# Patient Record
Sex: FEMALE | Race: WHITE | NOT HISPANIC OR LATINO | Employment: OTHER | URBAN - METROPOLITAN AREA
[De-identification: names, ages, dates, MRNs, and addresses within clinical notes are randomized per-mention and may not be internally consistent; named-entity substitution may affect disease eponyms.]

---

## 2017-08-02 ENCOUNTER — HOSPITAL ENCOUNTER (OUTPATIENT)
Dept: LAB | Facility: HOSPITAL | Age: 82
Discharge: HOME | End: 2017-08-02
Attending: INTERNAL MEDICINE | Admitting: INTERNAL MEDICINE

## 2017-08-02 LAB
INR PPP: 1.9 (ref 2–3)
PROTHROMBIN TIME: 20.5 SEC (ref 19.4–28.5)

## 2017-10-27 ENCOUNTER — HOSPITAL ENCOUNTER (OUTPATIENT)
Dept: FAMILY MEDICINE CLINIC | Facility: CLINIC | Age: 82
Setting detail: SPECIMEN
Discharge: HOME | End: 2017-10-27

## 2017-10-27 LAB — TSH SERPL-ACNC: 1.2 UIU/ML (ref 0.34–5.6)

## 2018-01-01 ENCOUNTER — HOSPITAL ENCOUNTER (OUTPATIENT)
Dept: LAB | Facility: HOSPITAL | Age: 83
Discharge: HOME | End: 2018-11-02
Attending: INTERNAL MEDICINE | Admitting: INTERNAL MEDICINE

## 2018-01-01 ENCOUNTER — PATIENT OUTREACH (OUTPATIENT)
Dept: CASE MANAGEMENT | Facility: OTHER | Age: 83
End: 2018-01-01

## 2018-01-01 ENCOUNTER — HOSPITAL ENCOUNTER (OUTPATIENT)
Dept: FAMILY MEDICINE CLINIC | Facility: CLINIC | Age: 83
Setting detail: SPECIMEN
Discharge: HOME | End: 2018-11-09
Attending: PHYSICIAN ASSISTANT | Admitting: PHYSICIAN ASSISTANT

## 2018-01-01 ENCOUNTER — HOSPITAL ENCOUNTER (OUTPATIENT)
Dept: LAB | Facility: HOSPITAL | Age: 83
Discharge: HOME | End: 2018-11-15
Attending: INTERNAL MEDICINE | Admitting: INTERNAL MEDICINE

## 2018-01-01 LAB
ANION GAP SERPL CALC-SCNC: 15.9 MMOL/L (ref 10–20)
BILIRUB UR QL STRIP: NEGATIVE MG/DL
BUN SERPL-MCNC: 18 MG/DL (ref 8–20)
BUN/CREAT SERPL: 10 (ref 5.4–26.2)
CALCIUM SERPL-MCNC: 8.9 MG/DL (ref 8.9–10.3)
CASTS URNS QL MICRO: NORMAL /[LPF]
CHLORIDE SERPL-SCNC: 102 MMOL/L (ref 101–111)
COLOR UR: YELLOW
CONV BACTERIA IN URINE MICRO: NEGATIVE
CONV CLARITY OF URINE: CLEAR
CONV CO2: 24 MMOL/L (ref 22–32)
CONV HYALINE CASTS IN URINE MICRO: 0 /[LPF] (ref 0–5)
CONV PROTEIN IN URINE BY AUTOMATED TEST STRIP: NEGATIVE MG/DL
CONV SMALL ROUND CELLS: NORMAL /[HPF]
CONV UROBILINOGEN IN URINE BY AUTOMATED TEST STRIP: 0.2 MG/DL
CREAT UR-MCNC: 1.8 MG/DL (ref 0.4–1)
CULTURE INDICATED?: NORMAL
GLUCOSE SERPL-MCNC: 108 MG/DL (ref 65–99)
GLUCOSE UR QL: NEGATIVE MG/DL
HGB UR QL STRIP: NEGATIVE
INR PPP: 2.6 (ref 2–3)
KETONES UR QL STRIP: NEGATIVE MG/DL
LEUKOCYTE ESTERASE UR QL STRIP: NEGATIVE
NITRITE UR QL STRIP: NEGATIVE
PH UR STRIP.AUTO: 6 [PH] (ref 4.5–8)
POTASSIUM SERPL-SCNC: 3.9 MMOL/L (ref 3.6–5.1)
PROTHROMBIN TIME: 25.1 SEC (ref 19.4–28.5)
RBC #/AREA URNS HPF: 0 /[HPF] (ref 0–3)
SODIUM SERPL-SCNC: 138 MMOL/L (ref 136–144)
SP GR UR: 1.01 (ref 1–1.03)
SPERM URNS QL MICRO: NORMAL /[HPF]
SQUAMOUS SPT QL MICRO: 0 /[HPF] (ref 0–5)
UNIDENT CRYS URNS QL MICRO: NORMAL /[HPF]
WBC #/AREA URNS HPF: 0 /[HPF] (ref 0–5)
YEAST SPEC QL WET PREP: NORMAL /[HPF]

## 2018-05-29 ENCOUNTER — INPATIENT HOSPITAL (OUTPATIENT)
Dept: URBAN - METROPOLITAN AREA HOSPITAL 84 | Facility: HOSPITAL | Age: 83
End: 2018-05-29
Payer: COMMERCIAL

## 2018-05-29 DIAGNOSIS — K31.82 DIEULAFOY LESION (HEMORRHAGIC) OF STOMACH AND DUODENUM: ICD-10-CM

## 2018-05-29 DIAGNOSIS — K44.9 DIAPHRAGMATIC HERNIA WITHOUT OBSTRUCTION OR GANGRENE: ICD-10-CM

## 2018-05-29 DIAGNOSIS — R93.3 ABNORMAL FINDINGS ON DIAGNOSTIC IMAGING OF OTHER PARTS OF DI: ICD-10-CM

## 2018-05-29 DIAGNOSIS — D50.0 IRON DEFICIENCY ANEMIA SECONDARY TO BLOOD LOSS (CHRONIC): ICD-10-CM

## 2018-05-29 DIAGNOSIS — K92.1 MELENA: ICD-10-CM

## 2018-05-29 PROCEDURE — 43255 EGD CONTROL BLEEDING ANY: CPT | Performed by: INTERNAL MEDICINE

## 2018-05-30 PROCEDURE — 99232 SBSQ HOSP IP/OBS MODERATE 35: CPT | Performed by: INTERNAL MEDICINE

## 2018-05-31 ENCOUNTER — INPATIENT HOSPITAL (OUTPATIENT)
Dept: URBAN - METROPOLITAN AREA HOSPITAL 84 | Facility: HOSPITAL | Age: 83
End: 2018-05-31
Payer: COMMERCIAL

## 2018-05-31 DIAGNOSIS — K31.82 DIEULAFOY LESION (HEMORRHAGIC) OF STOMACH AND DUODENUM: ICD-10-CM

## 2018-05-31 PROCEDURE — 99232 SBSQ HOSP IP/OBS MODERATE 35: CPT | Performed by: INTERNAL MEDICINE

## 2018-06-07 ENCOUNTER — HOSPITAL ENCOUNTER (OUTPATIENT)
Dept: HOME HEALTH SERVICES | Facility: HOME HEALTHCARE | Age: 83
Setting detail: SPECIMEN
Discharge: HOME | End: 2018-06-07
Attending: FAMILY MEDICINE | Admitting: FAMILY MEDICINE

## 2018-06-07 LAB
ANION GAP SERPL CALC-SCNC: 10.7 MMOL/L (ref 10–20)
BUN SERPL-MCNC: 14 MG/DL (ref 8–20)
BUN/CREAT SERPL: 8.8 (ref 5.4–26.2)
CALCIUM SERPL-MCNC: 8.9 MG/DL (ref 8.9–10.3)
CHLORIDE SERPL-SCNC: 108 MMOL/L (ref 101–111)
CONV CO2: 23 MMOL/L (ref 22–32)
CREAT UR-MCNC: 1.6 MG/DL (ref 0.4–1)
GLUCOSE SERPL-MCNC: 107 MG/DL (ref 65–99)
POTASSIUM SERPL-SCNC: 4.7 MMOL/L (ref 3.6–5.1)
SODIUM SERPL-SCNC: 137 MMOL/L (ref 136–144)

## 2018-06-14 ENCOUNTER — OFFICE (OUTPATIENT)
Dept: URBAN - METROPOLITAN AREA CLINIC 64 | Facility: CLINIC | Age: 83
End: 2018-06-14
Payer: COMMERCIAL

## 2018-06-14 VITALS
HEIGHT: 61 IN | WEIGHT: 119 LBS | SYSTOLIC BLOOD PRESSURE: 85 MMHG | DIASTOLIC BLOOD PRESSURE: 55 MMHG | HEART RATE: 83 BPM

## 2018-06-14 DIAGNOSIS — R93.3 ABNORMAL FINDINGS ON DIAGNOSTIC IMAGING OF OTHER PARTS OF DI: ICD-10-CM

## 2018-06-14 DIAGNOSIS — K86.2 CYST OF PANCREAS: ICD-10-CM

## 2018-06-14 DIAGNOSIS — K63.81 DIEULAFOY LESION OF INTESTINE: ICD-10-CM

## 2018-06-14 PROCEDURE — 99213 OFFICE O/P EST LOW 20 MIN: CPT | Performed by: NURSE PRACTITIONER

## 2018-07-13 ENCOUNTER — OFFICE (OUTPATIENT)
Dept: URBAN - METROPOLITAN AREA CLINIC 64 | Facility: CLINIC | Age: 83
End: 2018-07-13
Payer: COMMERCIAL

## 2018-07-13 VITALS
HEART RATE: 83 BPM | HEIGHT: 61 IN | WEIGHT: 119 LBS | SYSTOLIC BLOOD PRESSURE: 112 MMHG | DIASTOLIC BLOOD PRESSURE: 59 MMHG

## 2018-07-13 DIAGNOSIS — R93.3 ABNORMAL FINDINGS ON DIAGNOSTIC IMAGING OF OTHER PARTS OF DI: ICD-10-CM

## 2018-07-13 DIAGNOSIS — K86.2 CYST OF PANCREAS: ICD-10-CM

## 2018-07-13 PROCEDURE — 99213 OFFICE O/P EST LOW 20 MIN: CPT | Performed by: INTERNAL MEDICINE

## 2018-08-02 ENCOUNTER — HOSPITAL ENCOUNTER (OUTPATIENT)
Dept: FAMILY MEDICINE CLINIC | Facility: CLINIC | Age: 83
Setting detail: SPECIMEN
Discharge: HOME | End: 2018-08-02
Attending: FAMILY MEDICINE | Admitting: FAMILY MEDICINE

## 2018-08-02 LAB
ALBUMIN SERPL-MCNC: 4.1 G/DL (ref 3.5–4.8)
ALBUMIN/GLOB SERPL: 1.2 {RATIO} (ref 1–1.7)
ALP SERPL-CCNC: 65 IU/L (ref 32–91)
ALT SERPL-CCNC: 18 IU/L (ref 14–54)
ANION GAP SERPL CALC-SCNC: 14.5 MMOL/L (ref 10–20)
AST SERPL-CCNC: 37 IU/L (ref 15–41)
BASOPHILS # BLD AUTO: 0.1 10*3/UL (ref 0–0.2)
BASOPHILS NFR BLD AUTO: 1 % (ref 0–2)
BILIRUB SERPL-MCNC: 0.4 MG/DL (ref 0.3–1.2)
BUN SERPL-MCNC: 18 MG/DL (ref 8–20)
BUN/CREAT SERPL: 10 (ref 5.4–26.2)
CALCIUM SERPL-MCNC: 9.4 MG/DL (ref 8.9–10.3)
CHLORIDE SERPL-SCNC: 101 MMOL/L (ref 101–111)
CONV CO2: 27 MMOL/L (ref 22–32)
CONV TOTAL PROTEIN: 7.4 G/DL (ref 6.1–7.9)
CREAT UR-MCNC: 1.8 MG/DL (ref 0.4–1)
DIFFERENTIAL METHOD BLD: (no result)
EOSINOPHIL # BLD AUTO: 0 10*3/UL (ref 0–0.3)
EOSINOPHIL # BLD AUTO: 1 % (ref 0–3)
ERYTHROCYTE [DISTWIDTH] IN BLOOD BY AUTOMATED COUNT: 17 % (ref 11.5–14.5)
GLOBULIN UR ELPH-MCNC: 3.3 G/DL (ref 2.5–3.8)
GLUCOSE SERPL-MCNC: 102 MG/DL (ref 65–99)
HCT VFR BLD AUTO: 28.1 % (ref 35–49)
HGB BLD-MCNC: 9.1 G/DL (ref 12–15)
LYMPHOCYTES # BLD AUTO: 0.5 10*3/UL (ref 0.8–4.8)
LYMPHOCYTES NFR BLD AUTO: 11 % (ref 18–42)
MCH RBC QN AUTO: 26.7 PG (ref 26–32)
MCHC RBC AUTO-ENTMCNC: 32.3 G/DL (ref 32–36)
MCV RBC AUTO: 82.6 FL (ref 80–94)
MONOCYTES # BLD AUTO: 0.6 10*3/UL (ref 0.1–1.3)
MONOCYTES NFR BLD AUTO: 12 % (ref 2–11)
NEUTROPHILS # BLD AUTO: 3.6 10*3/UL (ref 2.3–8.6)
NEUTROPHILS NFR BLD AUTO: 75 % (ref 50–75)
NRBC BLD AUTO-RTO: 0 /100{WBCS}
NRBC/RBC NFR BLD MANUAL: 0 10*3/UL
PLATELET # BLD AUTO: 185 10*3/UL (ref 150–450)
PMV BLD AUTO: 8 FL (ref 7.4–10.4)
POTASSIUM SERPL-SCNC: 4.5 MMOL/L (ref 3.6–5.1)
RBC # BLD AUTO: 3.4 10*6/UL (ref 4–5.4)
SODIUM SERPL-SCNC: 138 MMOL/L (ref 136–144)
WBC # BLD AUTO: 4.8 10*3/UL (ref 4.5–11.5)

## 2018-09-10 NOTE — OUTREACH NOTE
Care Management Plan 9/10/2018   Lifestyle Goals Decrease falls risk;Eat a healthy diet;Avoid respiratory irritants;Fewer exacerbations;Have more energy;Routine follow-up with doctor(s)   Barriers Disease education   Self Management Dietary Changes - Eat More Fruits/Vegetables;Get flu/pneumonia shot;Medication Adherence   Annual Wellness Visit:  Patient Will Schedule   Does patient have depression diagnosis? No   Advanced Directives: Not Interested At This Time   Hospitalizations past 12 months 2 or 3   Discharged From: [facility] - OBSV   Discharged to: Home / Caregiver   Admit Date: 09/03/2014   Discharge Date: 09/04/2018   Discharge destination: Home   Medication Adherence Other (see comment)   Medication Adherence Caregiver assistance.        Purpose of Contact:   ED/Observation follow up related to complaint of shortness of breath     Care Plan Reviewed/Updated: Assessment & Care Plan created this date with Daughter/Caregiver Stacia Parham.     Recent ED/OBSV/Inpatient Activity: [facility] - 9/3/18-9/4/18 Observation      Follow Up Appointments Scheduled: Yes, Caregiver states patient has f/u appointment with Dr. Landaverde scheduled for next week.     My Chart: N/A (Devan Patient)     Advance Care Planning:  Not interested at this time     Annual Wellness Visit: Not interested at this time.     Noted Status of ACO Gaps In Care: To be addressed in future session     The main concerns and/or symptoms the patient would like to address:   Caregiver reports that patient is doing better and has no unaddressed needs or concerns at this time.  Patient has a new walker and is enjoying being a little more independent.       Education/instruction provided by Care Coordinator this contact: Discharge instructions, Importance of follow up care, healthy diet, and falls risks.  Patient caregiver states adherence and understanding of medication regime.  Pt/INR is done monthly by PCP.  She denies other need/concern at this  time.  RN-CA contact information provided.     Follow Up Outreach Due: Ongoing 1MX3    Radha Shepard RN

## 2019-01-01 ENCOUNTER — OFFICE VISIT (OUTPATIENT)
Dept: CARDIOLOGY | Facility: CLINIC | Age: 84
End: 2019-01-01

## 2019-01-01 ENCOUNTER — APPOINTMENT (OUTPATIENT)
Dept: GENERAL RADIOLOGY | Facility: HOSPITAL | Age: 84
End: 2019-01-01

## 2019-01-01 ENCOUNTER — EPISODE CHANGES (OUTPATIENT)
Dept: CASE MANAGEMENT | Facility: OTHER | Age: 84
End: 2019-01-01

## 2019-01-01 ENCOUNTER — HOSPITAL ENCOUNTER (OUTPATIENT)
Dept: LAB | Facility: HOSPITAL | Age: 84
Discharge: HOME | End: 2019-06-04
Attending: INTERNAL MEDICINE | Admitting: INTERNAL MEDICINE

## 2019-01-01 ENCOUNTER — HOSPITAL ENCOUNTER (OUTPATIENT)
Dept: LAB | Facility: HOSPITAL | Age: 84
Discharge: HOME | End: 2019-02-15
Attending: INTERNAL MEDICINE | Admitting: INTERNAL MEDICINE

## 2019-01-01 ENCOUNTER — APPOINTMENT (OUTPATIENT)
Dept: CT IMAGING | Facility: HOSPITAL | Age: 84
End: 2019-01-01

## 2019-01-01 ENCOUNTER — TELEPHONE (OUTPATIENT)
Dept: CARDIOLOGY | Facility: CLINIC | Age: 84
End: 2019-01-01

## 2019-01-01 ENCOUNTER — LAB REQUISITION (OUTPATIENT)
Dept: LAB | Facility: HOSPITAL | Age: 84
End: 2019-01-01

## 2019-01-01 ENCOUNTER — HOSPITAL ENCOUNTER (INPATIENT)
Facility: HOSPITAL | Age: 84
LOS: 4 days | End: 2019-08-19
Attending: INTERNAL MEDICINE | Admitting: INTERNAL MEDICINE

## 2019-01-01 ENCOUNTER — APPOINTMENT (OUTPATIENT)
Dept: ULTRASOUND IMAGING | Facility: HOSPITAL | Age: 84
End: 2019-01-01

## 2019-01-01 ENCOUNTER — HOSPITAL ENCOUNTER (INPATIENT)
Facility: HOSPITAL | Age: 84
LOS: 4 days | Discharge: HOME HEALTH | End: 2019-07-09
Attending: EMERGENCY MEDICINE | Admitting: HOSPITALIST

## 2019-01-01 ENCOUNTER — READMISSION MANAGEMENT (OUTPATIENT)
Dept: CALL CENTER | Facility: HOSPITAL | Age: 84
End: 2019-01-01

## 2019-01-01 ENCOUNTER — HOSPITAL ENCOUNTER (INPATIENT)
Dept: CARDIOLOGY | Facility: HOSPITAL | Age: 84
Discharge: HOME | End: 2019-07-06

## 2019-01-01 ENCOUNTER — INPATIENT HOSPITAL (OUTPATIENT)
Dept: URBAN - METROPOLITAN AREA HOSPITAL 84 | Facility: HOSPITAL | Age: 84
End: 2019-01-01
Payer: COMMERCIAL

## 2019-01-01 ENCOUNTER — HOSPITAL ENCOUNTER (EMERGENCY)
Facility: HOSPITAL | Age: 84
Discharge: HOME | End: 2019-08-13
Attending: EMERGENCY MEDICINE | Admitting: EMERGENCY MEDICINE

## 2019-01-01 ENCOUNTER — PATIENT OUTREACH (OUTPATIENT)
Dept: CASE MANAGEMENT | Facility: OTHER | Age: 84
End: 2019-01-01

## 2019-01-01 ENCOUNTER — ANESTHESIA EVENT (OUTPATIENT)
Dept: GASTROENTEROLOGY | Facility: HOSPITAL | Age: 84
End: 2019-01-01

## 2019-01-01 ENCOUNTER — OFFICE VISIT (OUTPATIENT)
Dept: FAMILY MEDICINE CLINIC | Facility: CLINIC | Age: 84
End: 2019-01-01

## 2019-01-01 ENCOUNTER — ANESTHESIA (OUTPATIENT)
Dept: GASTROENTEROLOGY | Facility: HOSPITAL | Age: 84
End: 2019-01-01

## 2019-01-01 ENCOUNTER — TELEPHONE (OUTPATIENT)
Dept: FAMILY MEDICINE CLINIC | Facility: CLINIC | Age: 84
End: 2019-01-01

## 2019-01-01 VITALS
DIASTOLIC BLOOD PRESSURE: 64 MMHG | BODY MASS INDEX: 19.2 KG/M2 | RESPIRATION RATE: 18 BRPM | OXYGEN SATURATION: 100 % | TEMPERATURE: 98.1 F | HEART RATE: 67 BPM | HEIGHT: 64 IN | SYSTOLIC BLOOD PRESSURE: 123 MMHG | WEIGHT: 112.43 LBS

## 2019-01-01 VITALS
HEART RATE: 73 BPM | OXYGEN SATURATION: 98 % | SYSTOLIC BLOOD PRESSURE: 116 MMHG | BODY MASS INDEX: 19.31 KG/M2 | DIASTOLIC BLOOD PRESSURE: 60 MMHG | WEIGHT: 109 LBS | HEIGHT: 63 IN

## 2019-01-01 VITALS
WEIGHT: 110 LBS | SYSTOLIC BLOOD PRESSURE: 96 MMHG | BODY MASS INDEX: 21.6 KG/M2 | DIASTOLIC BLOOD PRESSURE: 58 MMHG | HEIGHT: 60 IN

## 2019-01-01 VITALS
OXYGEN SATURATION: 96 % | DIASTOLIC BLOOD PRESSURE: 61 MMHG | BODY MASS INDEX: 21.6 KG/M2 | RESPIRATION RATE: 16 BRPM | HEIGHT: 60 IN | TEMPERATURE: 97.6 F | SYSTOLIC BLOOD PRESSURE: 105 MMHG | WEIGHT: 110 LBS | HEART RATE: 61 BPM

## 2019-01-01 VITALS — OXYGEN SATURATION: 96 % | SYSTOLIC BLOOD PRESSURE: 119 MMHG | DIASTOLIC BLOOD PRESSURE: 62 MMHG

## 2019-01-01 VITALS
HEART RATE: 109 BPM | OXYGEN SATURATION: 99 % | RESPIRATION RATE: 16 BRPM | SYSTOLIC BLOOD PRESSURE: 40 MMHG | HEIGHT: 60 IN | TEMPERATURE: 97.6 F | BODY MASS INDEX: 24.11 KG/M2 | DIASTOLIC BLOOD PRESSURE: 27 MMHG | WEIGHT: 122.8 LBS

## 2019-01-01 VITALS
DIASTOLIC BLOOD PRESSURE: 59 MMHG | BODY MASS INDEX: 20.24 KG/M2 | SYSTOLIC BLOOD PRESSURE: 93 MMHG | WEIGHT: 110 LBS | HEIGHT: 62 IN | TEMPERATURE: 97.3 F | HEART RATE: 65 BPM | OXYGEN SATURATION: 96 %

## 2019-01-01 DIAGNOSIS — K92.1 MELENA: ICD-10-CM

## 2019-01-01 DIAGNOSIS — T45.511D: ICD-10-CM

## 2019-01-01 DIAGNOSIS — R53.83 OTHER FATIGUE: ICD-10-CM

## 2019-01-01 DIAGNOSIS — Z95.0 PRESENCE OF CARDIAC PACEMAKER: ICD-10-CM

## 2019-01-01 DIAGNOSIS — N17.9 ACUTE RENAL FAILURE, UNSPECIFIED ACUTE RENAL FAILURE TYPE (HCC): ICD-10-CM

## 2019-01-01 DIAGNOSIS — I95.89 HYPOTENSION DUE TO HYPOVOLEMIA: Primary | ICD-10-CM

## 2019-01-01 DIAGNOSIS — S02.85XA LEFT ORBIT FRACTURE, CLOSED, INITIAL ENCOUNTER (HCC): ICD-10-CM

## 2019-01-01 DIAGNOSIS — D64.89 OTHER SPECIFIED ANEMIAS: ICD-10-CM

## 2019-01-01 DIAGNOSIS — I25.10 ATHEROSCLEROTIC HEART DISEASE OF NATIVE CORONARY ARTERY WITHOUT ANGINA PECTORIS: ICD-10-CM

## 2019-01-01 DIAGNOSIS — E86.1 HYPOTENSION DUE TO HYPOVOLEMIA: Primary | ICD-10-CM

## 2019-01-01 DIAGNOSIS — R53.1 ACUTE WEAKNESS: ICD-10-CM

## 2019-01-01 DIAGNOSIS — I10 ESSENTIAL HYPERTENSION: ICD-10-CM

## 2019-01-01 DIAGNOSIS — T45.511A POISONING BY WARFARIN SODIUM, ACCIDENTAL OR UNINTENTIONAL, INITIAL ENCOUNTER: ICD-10-CM

## 2019-01-01 DIAGNOSIS — K92.2 ACUTE GI BLEEDING: ICD-10-CM
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
## 2019-01-01 DIAGNOSIS — I48.21 PERMANENT ATRIAL FIBRILLATION (HCC): ICD-10-CM

## 2019-01-01 DIAGNOSIS — I25.709 ATHEROSCLEROSIS OF CORONARY ARTERY BYPASS GRAFT OF NATIVE HEART WITH ANGINA PECTORIS (HCC): ICD-10-CM

## 2019-01-01 DIAGNOSIS — I50.9 ACUTE ON CHRONIC CONGESTIVE HEART FAILURE, UNSPECIFIED HEART FAILURE TYPE (HCC): Primary | ICD-10-CM

## 2019-01-01 DIAGNOSIS — E87.5 HYPERKALEMIA: ICD-10-CM

## 2019-01-01 DIAGNOSIS — K92.2 GASTROINTESTINAL HEMORRHAGE, UNSPECIFIED GASTROINTESTINAL HEMORRHAGE TYPE: ICD-10-CM

## 2019-01-01 DIAGNOSIS — S02.2XXA CLOSED FRACTURE OF NASAL BONE, INITIAL ENCOUNTER: ICD-10-CM

## 2019-01-01 DIAGNOSIS — K92.2 GASTROINTESTINAL HEMORRHAGE, UNSPECIFIED: ICD-10-CM

## 2019-01-01 DIAGNOSIS — I87.2 VENOUS INSUFFICIENCY OF BOTH LOWER EXTREMITIES: ICD-10-CM

## 2019-01-01 DIAGNOSIS — S06.0X0A CONCUSSION WITHOUT LOSS OF CONSCIOUSNESS, INITIAL ENCOUNTER: Primary | ICD-10-CM

## 2019-01-01 LAB
ABO + RH BLD: NORMAL
ABO GROUP BLD: NORMAL
ALBUMIN SERPL-MCNC: 2.1 G/DL (ref 3.5–4.8)
ALBUMIN SERPL-MCNC: 2.1 G/DL (ref 3.5–4.8)
ALBUMIN SERPL-MCNC: 2.2 G/DL (ref 3.5–4.8)
ALBUMIN SERPL-MCNC: 2.2 G/DL (ref 3.5–4.8)
ALBUMIN SERPL-MCNC: 2.3 G/DL (ref 3.5–4.8)
ALBUMIN SERPL-MCNC: 2.3 G/DL (ref 3.5–4.8)
ALBUMIN SERPL-MCNC: 2.4 G/DL (ref 3.5–4.8)
ALBUMIN/GLOB SERPL: 0.7 G/DL (ref 1–1.7)
ALBUMIN/GLOB SERPL: 0.8 G/DL (ref 1–1.7)
ALP SERPL-CCNC: 60 U/L (ref 32–91)
ALP SERPL-CCNC: 61 U/L (ref 32–91)
ALP SERPL-CCNC: 81 U/L (ref 32–91)
ALT SERPL W P-5'-P-CCNC: 14 U/L (ref 14–54)
ALT SERPL W P-5'-P-CCNC: 16 U/L (ref 14–54)
ALT SERPL W P-5'-P-CCNC: 16 U/L (ref 14–54)
ALT SERPL W P-5'-P-CCNC: 18 U/L (ref 14–54)
ALT SERPL W P-5'-P-CCNC: 19 U/L (ref 14–54)
ANION GAP SERPL CALC-SCNC: 13.6 MMOL/L (ref 10–20)
ANION GAP SERPL CALC-SCNC: 13.9 MMOL/L (ref 10–20)
ANION GAP SERPL CALCULATED.3IONS-SCNC: 10.2 MMOL/L (ref 5–15)
ANION GAP SERPL CALCULATED.3IONS-SCNC: 10.6 MMOL/L (ref 5–15)
ANION GAP SERPL CALCULATED.3IONS-SCNC: 12.5 MMOL/L (ref 10–20)
ANION GAP SERPL CALCULATED.3IONS-SCNC: 12.6 MMOL/L (ref 10–20)
ANION GAP SERPL CALCULATED.3IONS-SCNC: 12.7 MMOL/L (ref 5–15)
ANION GAP SERPL CALCULATED.3IONS-SCNC: 12.8 MMOL/L (ref 5–15)
ANION GAP SERPL CALCULATED.3IONS-SCNC: 13.2 MMOL/L (ref 5–15)
ANION GAP SERPL CALCULATED.3IONS-SCNC: 13.3 MMOL/L (ref 10–20)
ANION GAP SERPL CALCULATED.3IONS-SCNC: 13.3 MMOL/L (ref 10–20)
ANION GAP SERPL CALCULATED.3IONS-SCNC: 14.4 MMOL/L (ref 5–15)
ANION GAP SERPL CALCULATED.3IONS-SCNC: 14.8 MMOL/L (ref 5–15)
ANION GAP SERPL CALCULATED.3IONS-SCNC: 15.6 MMOL/L (ref 5–15)
ANISOCYTOSIS BLD QL: ABNORMAL
AST SERPL-CCNC: 26 U/L (ref 15–41)
AST SERPL-CCNC: 28 U/L (ref 15–41)
AST SERPL-CCNC: 29 U/L (ref 15–41)
AST SERPL-CCNC: 29 U/L (ref 15–41)
AST SERPL-CCNC: 32 U/L (ref 15–41)
BACTERIA UR QL AUTO: ABNORMAL /HPF
BACTERIA UR QL AUTO: ABNORMAL /HPF
BASOPHILS # BLD AUTO: 0 10*3/MM3 (ref 0–0.2)
BASOPHILS NFR BLD AUTO: 0.2 % (ref 0–1.5)
BASOPHILS NFR BLD AUTO: 0.4 % (ref 0–1.5)
BASOPHILS NFR BLD AUTO: 0.4 % (ref 0–1.5)
BASOPHILS NFR BLD AUTO: 0.6 % (ref 0–1.5)
BASOPHILS NFR BLD AUTO: 0.6 % (ref 0–1.5)
BASOPHILS NFR BLD AUTO: 0.7 % (ref 0–1.5)
BASOPHILS NFR BLD AUTO: 0.9 % (ref 0–1.5)
BASOPHILS NFR BLD AUTO: 0.9 % (ref 0–1.5)
BH BB BLOOD EXPIRATION DATE: NORMAL
BH BB BLOOD TYPE BARCODE: 9500
BH BB DISPENSE STATUS: NORMAL
BH BB PRODUCT CODE: NORMAL
BH BB UNIT NUMBER: NORMAL
BH CV ECHO MEAS - ACS: 1.5 CM
BH CV ECHO MEAS - AI DEC SLOPE: 158.2 CM/SEC^2
BH CV ECHO MEAS - AI DEC TIME: 2.2 SEC
BH CV ECHO MEAS - AI MAX PG: 47.2 MMHG
BH CV ECHO MEAS - AI MAX VEL: 343.3 CM/SEC
BH CV ECHO MEAS - AI P1/2T: 635.5 MSEC
BH CV ECHO MEAS - AO MAX PG (FULL): 21.7 MMHG
BH CV ECHO MEAS - AO MAX PG: 38 MMHG
BH CV ECHO MEAS - AO MEAN PG (FULL): 12.3 MMHG
BH CV ECHO MEAS - AO MEAN PG: 21.7 MMHG
BH CV ECHO MEAS - AO ROOT AREA (BSA CORRECTED): 2.2
BH CV ECHO MEAS - AO ROOT AREA: 7.9 CM^2
BH CV ECHO MEAS - AO ROOT DIAM: 3.2 CM
BH CV ECHO MEAS - AO V2 MAX: 268.8 CM/SEC
BH CV ECHO MEAS - AO V2 MEAN: 194.8 CM/SEC
BH CV ECHO MEAS - AO V2 VTI: 66.5 CM
BH CV ECHO MEAS - AVA(I,A): 1.4 CM^2
BH CV ECHO MEAS - AVA(I,D): 1.4 CM^2
BH CV ECHO MEAS - AVA(V,A): 1.5 CM^2
BH CV ECHO MEAS - AVA(V,D): 1.5 CM^2
BH CV ECHO MEAS - BSA(HAYCOCK): 1.5 M^2
BH CV ECHO MEAS - BSA: 1.4 M^2
BH CV ECHO MEAS - BZI_BMI: 21.5 KILOGRAMS/M^2
BH CV ECHO MEAS - BZI_METRIC_HEIGHT: 152.4 CM
BH CV ECHO MEAS - BZI_METRIC_WEIGHT: 49.9 KG
BH CV ECHO MEAS - EDV(CUBED): 90.8 ML
BH CV ECHO MEAS - EDV(MOD-SP4): 73.8 ML
BH CV ECHO MEAS - EDV(TEICH): 92.2 ML
BH CV ECHO MEAS - EF(CUBED): 66.2 %
BH CV ECHO MEAS - EF(MOD-BP): 56 %
BH CV ECHO MEAS - EF(MOD-SP4): 55.9 %
BH CV ECHO MEAS - EF(TEICH): 57.9 %
BH CV ECHO MEAS - ESV(CUBED): 30.7 ML
BH CV ECHO MEAS - ESV(MOD-SP4): 32.6 ML
BH CV ECHO MEAS - ESV(TEICH): 38.8 ML
BH CV ECHO MEAS - FS: 30.4 %
BH CV ECHO MEAS - IVS/LVPW: 1.2
BH CV ECHO MEAS - IVSD: 1 CM
BH CV ECHO MEAS - LA DIMENSION(2D): 4.6 CM
BH CV ECHO MEAS - LV DIASTOLIC VOL/BSA (35-75): 50.9 ML/M^2
BH CV ECHO MEAS - LV MASS(C)D: 140.7 GRAMS
BH CV ECHO MEAS - LV MASS(C)DI: 97.2 GRAMS/M^2
BH CV ECHO MEAS - LV MAX PG: 7.7 MMHG
BH CV ECHO MEAS - LV MEAN PG: 4.7 MMHG
BH CV ECHO MEAS - LV SYSTOLIC VOL/BSA (12-30): 22.5 ML/M^2
BH CV ECHO MEAS - LV V1 MAX: 138.5 CM/SEC
BH CV ECHO MEAS - LV V1 MEAN: 103.1 CM/SEC
BH CV ECHO MEAS - LV V1 VTI: 32.1 CM
BH CV ECHO MEAS - LVIDD: 4.5 CM
BH CV ECHO MEAS - LVIDS: 3.1 CM
BH CV ECHO MEAS - LVOT AREA: 2.9 CM^2
BH CV ECHO MEAS - LVOT DIAM: 1.9 CM
BH CV ECHO MEAS - LVPWD: 0.84 CM
BH CV ECHO MEAS - MR MAX PG: 108.2 MMHG
BH CV ECHO MEAS - MR MAX VEL: 519.1 CM/SEC
BH CV ECHO MEAS - MV DEC TIME: 0.15 SEC
BH CV ECHO MEAS - MV E MAX VEL: 89.6 CM/SEC
BH CV ECHO MEAS - MV MAX PG: 3.4 MMHG
BH CV ECHO MEAS - MV MEAN PG: 1.1 MMHG
BH CV ECHO MEAS - MV V2 MAX: 92.3 CM/SEC
BH CV ECHO MEAS - MV V2 MEAN: 46.7 CM/SEC
BH CV ECHO MEAS - MV V2 VTI: 23.8 CM
BH CV ECHO MEAS - MVA(VTI): 3.9 CM^2
BH CV ECHO MEAS - PA MAX PG (FULL): 0.9 MMHG
BH CV ECHO MEAS - PA MAX PG: 3.2 MMHG
BH CV ECHO MEAS - PA V2 MAX: 90 CM/SEC
BH CV ECHO MEAS - PI END-D VEL: 99.8 CM/SEC
BH CV ECHO MEAS - PI MAX PG: 17.4 MMHG
BH CV ECHO MEAS - PI MAX VEL: 208.5 CM/SEC
BH CV ECHO MEAS - PVA(V,A): 2.1 CM^2
BH CV ECHO MEAS - PVA(V,D): 2.1 CM^2
BH CV ECHO MEAS - QP/QS: 0.4
BH CV ECHO MEAS - RAP SYSTOLE: 3 MMHG
BH CV ECHO MEAS - RV MAX PG: 2.3 MMHG
BH CV ECHO MEAS - RV MEAN PG: 0.82 MMHG
BH CV ECHO MEAS - RV V1 MAX: 76.4 CM/SEC
BH CV ECHO MEAS - RV V1 MEAN: 41.1 CM/SEC
BH CV ECHO MEAS - RV V1 VTI: 14.8 CM
BH CV ECHO MEAS - RVDD: 3.1 CM
BH CV ECHO MEAS - RVOT AREA: 2.5 CM^2
BH CV ECHO MEAS - RVOT DIAM: 1.8 CM
BH CV ECHO MEAS - RVSP: 28.3 MMHG
BH CV ECHO MEAS - SI(AO): 363.8 ML/M^2
BH CV ECHO MEAS - SI(CUBED): 41.6 ML/M^2
BH CV ECHO MEAS - SI(LVOT): 63.2 ML/M^2
BH CV ECHO MEAS - SI(MOD-SP4): 28.5 ML/M^2
BH CV ECHO MEAS - SI(TEICH): 36.9 ML/M^2
BH CV ECHO MEAS - SV(AO): 526.7 ML
BH CV ECHO MEAS - SV(CUBED): 60.2 ML
BH CV ECHO MEAS - SV(LVOT): 91.5 ML
BH CV ECHO MEAS - SV(MOD-SP4): 41.2 ML
BH CV ECHO MEAS - SV(RVOT): 36.3 ML
BH CV ECHO MEAS - SV(TEICH): 53.4 ML
BH CV ECHO MEAS - TR MAX VEL: 250.6 CM/SEC
BILIRUB SERPL-MCNC: 0.9 MG/DL (ref 0.3–1.2)
BILIRUB SERPL-MCNC: 0.9 MG/DL (ref 0.3–1.2)
BILIRUB SERPL-MCNC: 1 MG/DL (ref 0.3–1.2)
BILIRUB SERPL-MCNC: 1.1 MG/DL (ref 0.3–1.2)
BILIRUB SERPL-MCNC: 2.4 MG/DL (ref 0.3–1.2)
BILIRUB UR QL STRIP: ABNORMAL
BILIRUB UR QL STRIP: NEGATIVE
BLD GP AB SCN SERPL QL: NEGATIVE
BNP SERPL-MCNC: 472 PG/ML
BNP SERPL-MCNC: 588 PG/ML
BNP SERPL-MCNC: 667 PG/ML
BUN BLD-MCNC: 109 MG/DL (ref 8–20)
BUN BLD-MCNC: 109 MG/DL (ref 8–20)
BUN BLD-MCNC: 110 MG/DL (ref 8–20)
BUN BLD-MCNC: 110 MG/DL (ref 8–20)
BUN BLD-MCNC: 111 MG/DL (ref 8–20)
BUN BLD-MCNC: 118 MG/DL (ref 8–20)
BUN BLD-MCNC: 40 MG/DL (ref 8–20)
BUN BLD-MCNC: 41 MG/DL (ref 8–20)
BUN BLD-MCNC: 44 MG/DL (ref 8–20)
BUN BLD-MCNC: 45 MG/DL (ref 8–20)
BUN BLD-MCNC: 46 MG/DL (ref 8–20)
BUN BLD-MCNC: 57 MG/DL (ref 8–20)
BUN SERPL-MCNC: 13 MG/DL (ref 8–20)
BUN SERPL-MCNC: 19 MG/DL (ref 8–20)
BUN/CREAT SERPL: 12.7 (ref 5.4–26.2)
BUN/CREAT SERPL: 17.8 (ref 5.4–26.2)
BUN/CREAT SERPL: 18.2 (ref 5.4–26.2)
BUN/CREAT SERPL: 20.9 (ref 5.4–26.2)
BUN/CREAT SERPL: 21 (ref 5.4–26.2)
BUN/CREAT SERPL: 21.4 (ref 5.4–26.2)
BUN/CREAT SERPL: 21.9 (ref 5.4–26.2)
BUN/CREAT SERPL: 22 (ref 5.4–26.2)
BUN/CREAT SERPL: 22.4 (ref 5.4–26.2)
BUN/CREAT SERPL: 22.7 (ref 5.4–26.2)
BUN/CREAT SERPL: 22.7 (ref 5.4–26.2)
BUN/CREAT SERPL: 23.2 (ref 5.4–26.2)
BUN/CREAT SERPL: 25.9 (ref 5.4–26.2)
BUN/CREAT SERPL: 8.7 (ref 5.4–26.2)
BURR CELLS BLD QL SMEAR: ABNORMAL
CA-I SERPL ISE-MCNC: 1.13 MMOL/L (ref 1.2–1.3)
CALCIUM SERPL-MCNC: 8.5 MG/DL (ref 8.9–10.3)
CALCIUM SERPL-MCNC: 9.1 MG/DL (ref 8.9–10.3)
CALCIUM SPEC-SCNC: 6.8 MG/DL (ref 8.9–10.3)
CALCIUM SPEC-SCNC: 7.2 MG/DL (ref 8.9–10.3)
CALCIUM SPEC-SCNC: 7.3 MG/DL (ref 8.9–10.3)
CALCIUM SPEC-SCNC: 7.4 MG/DL (ref 8.9–10.3)
CALCIUM SPEC-SCNC: 7.7 MG/DL (ref 8.9–10.3)
CALCIUM SPEC-SCNC: 7.8 MG/DL (ref 8.9–10.3)
CALCIUM SPEC-SCNC: 7.9 MG/DL (ref 8.9–10.3)
CALCIUM SPEC-SCNC: 8 MG/DL (ref 8.9–10.3)
CALCIUM SPEC-SCNC: 8.1 MG/DL (ref 8.9–10.3)
CALCIUM SPEC-SCNC: 8.3 MG/DL (ref 8.9–10.3)
CHLORIDE SERPL-SCNC: 101 MMOL/L (ref 101–111)
CHLORIDE SERPL-SCNC: 101 MMOL/L (ref 101–111)
CHLORIDE SERPL-SCNC: 106 MMOL/L (ref 101–111)
CHLORIDE SERPL-SCNC: 107 MMOL/L (ref 101–111)
CHLORIDE SERPL-SCNC: 107 MMOL/L (ref 101–111)
CHLORIDE SERPL-SCNC: 108 MMOL/L (ref 101–111)
CHLORIDE SERPL-SCNC: 109 MMOL/L (ref 101–111)
CHLORIDE SERPL-SCNC: 111 MMOL/L (ref 101–111)
CHLORIDE SERPL-SCNC: 113 MMOL/L (ref 101–111)
CHLORIDE SERPL-SCNC: 113 MMOL/L (ref 101–111)
CHLORIDE SERPL-SCNC: 115 MMOL/L (ref 101–111)
CHLORIDE SERPL-SCNC: 117 MMOL/L (ref 101–111)
CHLORIDE SERPL-SCNC: 117 MMOL/L (ref 101–111)
CHLORIDE SERPL-SCNC: 119 MMOL/L (ref 101–111)
CLARITY UR: ABNORMAL
CLARITY UR: CLEAR
CO2 SERPL-SCNC: 11 MMOL/L (ref 22–32)
CO2 SERPL-SCNC: 12 MMOL/L (ref 22–32)
CO2 SERPL-SCNC: 12 MMOL/L (ref 22–32)
CO2 SERPL-SCNC: 17 MMOL/L (ref 22–32)
CO2 SERPL-SCNC: 18 MMOL/L (ref 22–32)
CO2 SERPL-SCNC: 18 MMOL/L (ref 22–32)
CO2 SERPL-SCNC: 19 MMOL/L (ref 22–32)
CO2 SERPL-SCNC: 20 MMOL/L (ref 22–32)
CO2 SERPL-SCNC: 22 MMOL/L (ref 22–32)
CO2 SERPL-SCNC: 23 MMOL/L (ref 22–32)
COLOR UR: ABNORMAL
COLOR UR: YELLOW
CONV ABS BANDS: 0.5 10*3/UL
CONV ABS IMM GRAN: 0.06 10*3/UL
CONV ANISOCYTES: SLIGHT
CONV CO2: 25 MMOL/L (ref 22–32)
CONV CO2: 26 MMOL/L (ref 22–32)
CONV HYPOCHROMIA IN BLOOD BY LIGHT MICROSCOPY: SLIGHT
CONV PLATELETS GIANT IN BLOOD BY LIGHT MICROSCOPY: (no result)
CONV POLYCHROMASIA IN BLOOD BY LIGHT MICROSCOPY: SLIGHT
CONV TOXIC GRANULES IN BLOOD BY LIGHT MICROSCOPY: SLIGHT
CREAT BLD-MCNC: 2.1 MG/DL (ref 0.4–1)
CREAT BLD-MCNC: 2.1 MG/DL (ref 0.4–1)
CREAT BLD-MCNC: 2.2 MG/DL (ref 0.4–1)
CREAT BLD-MCNC: 2.3 MG/DL (ref 0.4–1)
CREAT BLD-MCNC: 4.7 MG/DL (ref 0.4–1)
CREAT BLD-MCNC: 4.8 MG/DL (ref 0.4–1)
CREAT BLD-MCNC: 4.9 MG/DL (ref 0.4–1)
CREAT BLD-MCNC: 4.9 MG/DL (ref 0.4–1)
CREAT BLD-MCNC: 5 MG/DL (ref 0.4–1)
CREAT BLD-MCNC: 5.4 MG/DL (ref 0.4–1)
CREAT UR-MCNC: 1.5 MG/DL (ref 0.4–1)
CREAT UR-MCNC: 1.5 MG/DL (ref 0.4–1)
CREAT UR-MCNC: 25.5 MG/DL
DEPRECATED RDW RBC AUTO: 52.1 FL (ref 37–54)
DEPRECATED RDW RBC AUTO: 52.1 FL (ref 37–54)
DEPRECATED RDW RBC AUTO: 52.5 FL (ref 37–54)
DEPRECATED RDW RBC AUTO: 54.3 FL (ref 37–54)
DEPRECATED RDW RBC AUTO: 54.7 FL (ref 37–54)
DEPRECATED RDW RBC AUTO: 62.6 FL (ref 37–54)
DEPRECATED RDW RBC AUTO: 63.4 FL (ref 37–54)
DEPRECATED RDW RBC AUTO: 65.2 FL (ref 37–54)
DEPRECATED RDW RBC AUTO: 68.3 FL (ref 37–54)
DIFFERENTIAL METHOD BLD: (no result)
EOSINOPHIL # BLD AUTO: 0 10*3/MM3 (ref 0–0.4)
EOSINOPHIL # BLD AUTO: 0.2 10*3/UL (ref 0–0.3)
EOSINOPHIL # BLD AUTO: 3 % (ref 0–3)
EOSINOPHIL NFR BLD AUTO: 0 % (ref 0.3–6.2)
EOSINOPHIL NFR BLD AUTO: 0 % (ref 0.3–6.2)
EOSINOPHIL NFR BLD AUTO: 0.1 % (ref 0.3–6.2)
EOSINOPHIL NFR BLD AUTO: 0.2 % (ref 0.3–6.2)
EOSINOPHIL NFR BLD AUTO: 0.6 % (ref 0.3–6.2)
EOSINOPHIL NFR BLD AUTO: 0.6 % (ref 0.3–6.2)
ERYTHROCYTE [DISTWIDTH] IN BLOOD BY AUTOMATED COUNT: 16.6 % (ref 12.3–15.4)
ERYTHROCYTE [DISTWIDTH] IN BLOOD BY AUTOMATED COUNT: 16.8 % (ref 12.3–15.4)
ERYTHROCYTE [DISTWIDTH] IN BLOOD BY AUTOMATED COUNT: 16.8 % (ref 12.3–15.4)
ERYTHROCYTE [DISTWIDTH] IN BLOOD BY AUTOMATED COUNT: 17.2 % (ref 12.3–15.4)
ERYTHROCYTE [DISTWIDTH] IN BLOOD BY AUTOMATED COUNT: 17.2 % (ref 12.3–15.4)
ERYTHROCYTE [DISTWIDTH] IN BLOOD BY AUTOMATED COUNT: 19.6 % (ref 12.3–15.4)
ERYTHROCYTE [DISTWIDTH] IN BLOOD BY AUTOMATED COUNT: 19.7 % (ref 12.3–15.4)
ERYTHROCYTE [DISTWIDTH] IN BLOOD BY AUTOMATED COUNT: 19.7 % (ref 12.3–15.4)
ERYTHROCYTE [DISTWIDTH] IN BLOOD BY AUTOMATED COUNT: 20.6 % (ref 12.3–15.4)
ERYTHROCYTE [DISTWIDTH] IN BLOOD BY AUTOMATED COUNT: 24.4 % (ref 11.5–14.5)
GFR SERPL CREATININE-BSD FRML MDRD: 20 ML/MIN/1.73
GFR SERPL CREATININE-BSD FRML MDRD: 21 ML/MIN/1.73
GFR SERPL CREATININE-BSD FRML MDRD: 22 ML/MIN/1.73
GFR SERPL CREATININE-BSD FRML MDRD: 22 ML/MIN/1.73
GFR SERPL CREATININE-BSD FRML MDRD: 7 ML/MIN/1.73
GFR SERPL CREATININE-BSD FRML MDRD: 8 ML/MIN/1.73
GFR SERPL CREATININE-BSD FRML MDRD: 9 ML/MIN/1.73
GFR SERPL CREATININE-BSD FRML MDRD: 9 ML/MIN/1.73
GFR SERPL CREATININE-BSD FRML MDRD: ABNORMAL ML/MIN/1.73
GLOBULIN UR ELPH-MCNC: 2.8 GM/DL (ref 2.5–3.8)
GLOBULIN UR ELPH-MCNC: 2.9 GM/DL (ref 2.5–3.8)
GLOBULIN UR ELPH-MCNC: 3.1 GM/DL (ref 2.5–3.8)
GLOBULIN UR ELPH-MCNC: 3.1 GM/DL (ref 2.5–3.8)
GLOBULIN UR ELPH-MCNC: 3.4 GM/DL (ref 2.5–3.8)
GLUCOSE BLD-MCNC: 102 MG/DL (ref 65–99)
GLUCOSE BLD-MCNC: 105 MG/DL (ref 65–99)
GLUCOSE BLD-MCNC: 107 MG/DL (ref 65–99)
GLUCOSE BLD-MCNC: 109 MG/DL (ref 65–99)
GLUCOSE BLD-MCNC: 113 MG/DL (ref 65–99)
GLUCOSE BLD-MCNC: 123 MG/DL (ref 65–99)
GLUCOSE BLD-MCNC: 148 MG/DL (ref 65–99)
GLUCOSE BLD-MCNC: 41 MG/DL (ref 65–99)
GLUCOSE BLD-MCNC: 79 MG/DL (ref 65–99)
GLUCOSE BLD-MCNC: 90 MG/DL (ref 65–99)
GLUCOSE BLD-MCNC: 92 MG/DL (ref 65–99)
GLUCOSE BLD-MCNC: 99 MG/DL (ref 65–99)
GLUCOSE BLDC GLUCOMTR-MCNC: 106 MG/DL (ref 70–105)
GLUCOSE BLDC GLUCOMTR-MCNC: 113 MG/DL (ref 70–105)
GLUCOSE BLDC GLUCOMTR-MCNC: 119 MG/DL (ref 70–105)
GLUCOSE BLDC GLUCOMTR-MCNC: 124 MG/DL (ref 70–105)
GLUCOSE BLDC GLUCOMTR-MCNC: 127 MG/DL (ref 70–105)
GLUCOSE BLDC GLUCOMTR-MCNC: 139 MG/DL (ref 70–105)
GLUCOSE BLDC GLUCOMTR-MCNC: 141 MG/DL (ref 70–105)
GLUCOSE BLDC GLUCOMTR-MCNC: 147 MG/DL (ref 70–105)
GLUCOSE BLDC GLUCOMTR-MCNC: 91 MG/DL (ref 70–105)
GLUCOSE BLDC GLUCOMTR-MCNC: 98 MG/DL (ref 70–105)
GLUCOSE SERPL-MCNC: 94 MG/DL (ref 65–99)
GLUCOSE SERPL-MCNC: 99 MG/DL (ref 65–99)
GLUCOSE UR STRIP-MCNC: NEGATIVE MG/DL
GLUCOSE UR STRIP-MCNC: NEGATIVE MG/DL
HCT VFR BLD AUTO: 23.7 % (ref 34–46.6)
HCT VFR BLD AUTO: 23.9 % (ref 34–46.6)
HCT VFR BLD AUTO: 24 % (ref 34–46.6)
HCT VFR BLD AUTO: 24 % (ref 34–46.6)
HCT VFR BLD AUTO: 25.2 % (ref 34–46.6)
HCT VFR BLD AUTO: 25.4 % (ref 34–46.6)
HCT VFR BLD AUTO: 25.6 % (ref 34–46.6)
HCT VFR BLD AUTO: 26 % (ref 34–46.6)
HCT VFR BLD AUTO: 26 % (ref 34–46.6)
HCT VFR BLD AUTO: 26.1 % (ref 34–46.6)
HCT VFR BLD AUTO: 26.3 % (ref 34–46.6)
HCT VFR BLD AUTO: 27 % (ref 34–46.6)
HCT VFR BLD AUTO: 27.1 % (ref 34–46.6)
HCT VFR BLD AUTO: 27.3 % (ref 34–46.6)
HCT VFR BLD AUTO: 27.4 % (ref 34–46.6)
HCT VFR BLD AUTO: 27.7 % (ref 34–46.6)
HCT VFR BLD AUTO: 27.9 % (ref 34–46.6)
HCT VFR BLD AUTO: 28.4 % (ref 34–46.6)
HCT VFR BLD AUTO: 29.9 % (ref 34–46.6)
HCT VFR BLD AUTO: 32.5 % (ref 34–46.6)
HCT VFR BLD AUTO: 37.5 % (ref 35–49)
HGB BLD-MCNC: 10.5 G/DL (ref 12–15.9)
HGB BLD-MCNC: 11.9 G/DL (ref 12–15)
HGB BLD-MCNC: 7.8 G/DL (ref 12–15.9)
HGB BLD-MCNC: 7.9 G/DL (ref 12–15.9)
HGB BLD-MCNC: 8 G/DL (ref 12–15.9)
HGB BLD-MCNC: 8.1 G/DL (ref 12–15.9)
HGB BLD-MCNC: 8.3 G/DL (ref 12–15.9)
HGB BLD-MCNC: 8.5 G/DL (ref 12–15.9)
HGB BLD-MCNC: 8.5 G/DL (ref 12–15.9)
HGB BLD-MCNC: 8.6 G/DL (ref 12–15.9)
HGB BLD-MCNC: 8.8 G/DL (ref 12–15.9)
HGB BLD-MCNC: 8.9 G/DL (ref 12–15.9)
HGB BLD-MCNC: 8.9 G/DL (ref 12–15.9)
HGB BLD-MCNC: 9 G/DL (ref 12–15.9)
HGB BLD-MCNC: 9.1 G/DL (ref 12–15.9)
HGB BLD-MCNC: 9.6 G/DL (ref 12–15.9)
HGB BLD-MCNC: 9.8 G/DL (ref 12–15.9)
HGB UR QL STRIP.AUTO: ABNORMAL
HGB UR QL STRIP.AUTO: ABNORMAL
HOLD SPECIMEN: NORMAL
HYALINE CASTS UR QL AUTO: ABNORMAL /LPF
HYALINE CASTS UR QL AUTO: ABNORMAL /LPF
INR PPP: 1.92 (ref 0.9–1.1)
INR PPP: 2.03 (ref 0.9–1.1)
INR PPP: 2.21 (ref 0.9–1.1)
INR PPP: 2.25 (ref 0.9–1.1)
INR PPP: 2.26 (ref 2–3)
INR PPP: 2.42 (ref 2–3)
INR PPP: 2.94 (ref 2–3)
INR PPP: 4.14 (ref 2–3)
INR PPP: 5.17 (ref 2–3)
INR PPP: 5.2 (ref 2–3)
INR PPP: 5.54 (ref 2–3)
INR PPP: 8.12 (ref 2–3)
KETONES UR QL STRIP: ABNORMAL
KETONES UR QL STRIP: NEGATIVE
LEUKOCYTE ESTERASE UR QL STRIP.AUTO: ABNORMAL
LEUKOCYTE ESTERASE UR QL STRIP.AUTO: NEGATIVE
LYMPHOCYTES # BLD AUTO: 0.4 10*3/MM3 (ref 0.7–3.1)
LYMPHOCYTES # BLD AUTO: 0.4 10*3/MM3 (ref 0.7–3.1)
LYMPHOCYTES # BLD AUTO: 0.5 10*3/MM3 (ref 0.7–3.1)
LYMPHOCYTES # BLD AUTO: 0.6 10*3/MM3 (ref 0.7–3.1)
LYMPHOCYTES # BLD AUTO: 0.9 10*3/MM3 (ref 0.7–3.1)
LYMPHOCYTES # BLD AUTO: 1 10*3/UL (ref 0.8–4.8)
LYMPHOCYTES # BLD MANUAL: 0.26 10*3/MM3 (ref 0.7–3.1)
LYMPHOCYTES NFR BLD AUTO: 12.5 % (ref 19.6–45.3)
LYMPHOCYTES NFR BLD AUTO: 14.3 % (ref 19.6–45.3)
LYMPHOCYTES NFR BLD AUTO: 14.4 % (ref 19.6–45.3)
LYMPHOCYTES NFR BLD AUTO: 16 % (ref 18–42)
LYMPHOCYTES NFR BLD AUTO: 18.8 % (ref 19.6–45.3)
LYMPHOCYTES NFR BLD AUTO: 6.9 % (ref 19.6–45.3)
LYMPHOCYTES NFR BLD AUTO: 9 % (ref 19.6–45.3)
LYMPHOCYTES NFR BLD AUTO: 9.1 % (ref 19.6–45.3)
LYMPHOCYTES NFR BLD AUTO: 9.5 % (ref 19.6–45.3)
LYMPHOCYTES NFR BLD MANUAL: 3 % (ref 19.6–45.3)
LYMPHOCYTES NFR BLD MANUAL: 3 % (ref 5–12)
MAGNESIUM SERPL-MCNC: 2.2 MG/DL (ref 1.8–2.5)
MAGNESIUM SERPL-MCNC: 2.3 MG/DL (ref 1.8–2.5)
MAGNESIUM SERPL-MCNC: 2.4 MG/DL (ref 1.8–2.5)
MAGNESIUM SERPL-MCNC: 2.8 MG/DL (ref 1.8–2.5)
MCH RBC QN AUTO: 26.9 PG (ref 26–32)
MCH RBC QN AUTO: 28.9 PG (ref 26.6–33)
MCH RBC QN AUTO: 29 PG (ref 26.6–33)
MCH RBC QN AUTO: 29.4 PG (ref 26.6–33)
MCH RBC QN AUTO: 29.4 PG (ref 26.6–33)
MCH RBC QN AUTO: 29.5 PG (ref 26.6–33)
MCH RBC QN AUTO: 29.6 PG (ref 26.6–33)
MCH RBC QN AUTO: 29.6 PG (ref 26.6–33)
MCH RBC QN AUTO: 29.7 PG (ref 26.6–33)
MCH RBC QN AUTO: 29.7 PG (ref 26.6–33)
MCHC RBC AUTO-ENTMCNC: 31.6 G/DL (ref 31.5–35.7)
MCHC RBC AUTO-ENTMCNC: 31.8 G/DL (ref 32–36)
MCHC RBC AUTO-ENTMCNC: 32 G/DL (ref 31.5–35.7)
MCHC RBC AUTO-ENTMCNC: 32.2 G/DL (ref 31.5–35.7)
MCHC RBC AUTO-ENTMCNC: 32.6 G/DL (ref 31.5–35.7)
MCHC RBC AUTO-ENTMCNC: 32.7 G/DL (ref 31.5–35.7)
MCHC RBC AUTO-ENTMCNC: 32.8 G/DL (ref 31.5–35.7)
MCHC RBC AUTO-ENTMCNC: 33.1 G/DL (ref 31.5–35.7)
MCV RBC AUTO: 84.7 FL (ref 80–94)
MCV RBC AUTO: 89 FL (ref 79–97)
MCV RBC AUTO: 89 FL (ref 79–97)
MCV RBC AUTO: 89.7 FL (ref 79–97)
MCV RBC AUTO: 89.7 FL (ref 79–97)
MCV RBC AUTO: 89.8 FL (ref 79–97)
MCV RBC AUTO: 90.7 FL (ref 79–97)
MCV RBC AUTO: 90.9 FL (ref 79–97)
MCV RBC AUTO: 92.7 FL (ref 79–97)
MCV RBC AUTO: 93.6 FL (ref 79–97)
METAMYELOCYTES NFR BLD MANUAL: 1 % (ref 0–0)
METAMYELOCYTES NFR BLD: 1 %
MONOCYTES # BLD AUTO: 0.26 10*3/MM3 (ref 0.1–0.9)
MONOCYTES # BLD AUTO: 0.3 10*3/MM3 (ref 0.1–0.9)
MONOCYTES # BLD AUTO: 0.4 10*3/MM3 (ref 0.1–0.9)
MONOCYTES # BLD AUTO: 0.5 10*3/UL (ref 0.1–1.3)
MONOCYTES # BLD AUTO: 0.6 10*3/MM3 (ref 0.1–0.9)
MONOCYTES NFR BLD AUTO: 4 % (ref 5–12)
MONOCYTES NFR BLD AUTO: 4.4 % (ref 5–12)
MONOCYTES NFR BLD AUTO: 6.1 % (ref 5–12)
MONOCYTES NFR BLD AUTO: 7 % (ref 5–12)
MONOCYTES NFR BLD AUTO: 8 % (ref 5–12)
MONOCYTES NFR BLD AUTO: 9 % (ref 2–11)
MONOCYTES NFR BLD AUTO: 9.3 % (ref 5–12)
MONOCYTES NFR BLD AUTO: 9.4 % (ref 5–12)
MONOCYTES NFR BLD AUTO: 9.6 % (ref 5–12)
MRSA SPEC QL CULT: NORMAL
NEUTROPHILS # BLD AUTO: 2.3 10*3/MM3 (ref 1.7–7)
NEUTROPHILS # BLD AUTO: 2.3 10*3/MM3 (ref 1.7–7)
NEUTROPHILS # BLD AUTO: 2.6 10*3/MM3 (ref 1.7–7)
NEUTROPHILS # BLD AUTO: 3.2 10*3/MM3 (ref 1.7–7)
NEUTROPHILS # BLD AUTO: 3.8 10*3/UL (ref 2.3–8.6)
NEUTROPHILS # BLD AUTO: 4.1 10*3/MM3 (ref 1.7–7)
NEUTROPHILS # BLD AUTO: 6.1 10*3/MM3 (ref 1.7–7)
NEUTROPHILS # BLD AUTO: 8 10*3/MM3 (ref 1.7–7)
NEUTROPHILS NFR BLD AUTO: 62 % (ref 50–75)
NEUTROPHILS NFR BLD AUTO: 70.1 % (ref 42.7–76)
NEUTROPHILS NFR BLD AUTO: 74.7 % (ref 42.7–76)
NEUTROPHILS NFR BLD AUTO: 76.8 % (ref 42.7–76)
NEUTROPHILS NFR BLD AUTO: 77.5 % (ref 42.7–76)
NEUTROPHILS NFR BLD AUTO: 82.9 % (ref 42.7–76)
NEUTROPHILS NFR BLD AUTO: 84.3 % (ref 42.7–76)
NEUTROPHILS NFR BLD AUTO: 86.4 % (ref 42.7–76)
NEUTROPHILS NFR BLD AUTO: 88.6 % (ref 42.7–76)
NEUTROPHILS NFR BLD MANUAL: 65 % (ref 42.7–76)
NEUTS BAND NFR BLD MANUAL: 28 % (ref 0–5)
NEUTS BAND NFR BLD MANUAL: 9 % (ref 0–5)
NITRITE UR QL STRIP: NEGATIVE
NITRITE UR QL STRIP: NEGATIVE
NRBC BLD AUTO-RTO: 0 /100 WBC (ref 0–0.2)
NRBC BLD AUTO-RTO: 0.1 /100 WBC (ref 0–0.2)
NRBC BLD AUTO-RTO: 0.1 /100 WBC (ref 0–0.2)
NRBC BLD AUTO-RTO: 0.2 /100 WBC (ref 0–0.2)
NRBC BLD AUTO-RTO: 0.2 /100 WBC (ref 0–0.2)
NRBC BLD AUTO-RTO: 0.3 /100 WBC (ref 0–0.2)
PH UR STRIP.AUTO: 5.5 [PH] (ref 5–8)
PH UR STRIP.AUTO: <=5 [PH] (ref 5–8)
PHOSPHATE SERPL-MCNC: 2.9 MG/DL (ref 2.4–4.7)
PHOSPHATE SERPL-MCNC: 3.1 MG/DL (ref 2.4–4.7)
PHOSPHATE SERPL-MCNC: 3.6 MG/DL (ref 2.4–4.7)
PHOSPHATE SERPL-MCNC: 3.6 MG/DL (ref 2.4–4.7)
PHOSPHATE SERPL-MCNC: 5.4 MG/DL (ref 2.4–4.7)
PHOSPHATE SERPL-MCNC: 5.9 MG/DL (ref 2.4–4.7)
PLAT MORPH BLD: NORMAL
PLATELET # BLD AUTO: 153 10*3/UL (ref 150–450)
PLATELET # BLD AUTO: 62 10*3/MM3 (ref 140–450)
PLATELET # BLD AUTO: 67 10*3/MM3 (ref 140–450)
PLATELET # BLD AUTO: 69 10*3/MM3 (ref 140–450)
PLATELET # BLD AUTO: 71 10*3/MM3 (ref 140–450)
PLATELET # BLD AUTO: 71 10*3/MM3 (ref 140–450)
PLATELET # BLD AUTO: 73 10*3/MM3 (ref 140–450)
PLATELET # BLD AUTO: 75 10*3/MM3 (ref 140–450)
PLATELET # BLD AUTO: 80 10*3/MM3 (ref 140–450)
PLATELET # BLD AUTO: 95 10*3/MM3 (ref 140–450)
PMV BLD AUTO: 7.6 FL (ref 6–12)
PMV BLD AUTO: 7.8 FL (ref 6–12)
PMV BLD AUTO: 8.1 FL (ref 6–12)
PMV BLD AUTO: 8.1 FL (ref 6–12)
PMV BLD AUTO: 8.2 FL (ref 6–12)
PMV BLD AUTO: 8.2 FL (ref 7.4–10.4)
PMV BLD AUTO: 8.4 FL (ref 6–12)
POTASSIUM BLD-SCNC: 4.3 MMOL/L (ref 3.6–5.1)
POTASSIUM BLD-SCNC: 4.3 MMOL/L (ref 3.6–5.1)
POTASSIUM BLD-SCNC: 4.5 MMOL/L (ref 3.6–5.1)
POTASSIUM BLD-SCNC: 4.6 MMOL/L (ref 3.6–5.1)
POTASSIUM BLD-SCNC: 4.6 MMOL/L (ref 3.6–5.1)
POTASSIUM BLD-SCNC: 5.2 MMOL/L (ref 3.6–5.1)
POTASSIUM BLD-SCNC: 5.4 MMOL/L (ref 3.6–5.1)
POTASSIUM BLD-SCNC: 5.6 MMOL/L (ref 3.6–5.1)
POTASSIUM BLD-SCNC: 5.8 MMOL/L (ref 3.6–5.1)
POTASSIUM BLD-SCNC: 6.2 MMOL/L (ref 3.6–5.1)
POTASSIUM BLD-SCNC: 6.7 MMOL/L (ref 3.6–5.1)
POTASSIUM BLD-SCNC: 6.8 MMOL/L (ref 3.6–5.1)
POTASSIUM SERPL-SCNC: 3.6 MMOL/L (ref 3.6–5.1)
POTASSIUM SERPL-SCNC: 3.9 MMOL/L (ref 3.6–5.1)
PROT SERPL-MCNC: 5 G/DL (ref 6.1–7.9)
PROT SERPL-MCNC: 5.1 G/DL (ref 6.1–7.9)
PROT SERPL-MCNC: 5.2 G/DL (ref 6.1–7.9)
PROT SERPL-MCNC: 5.3 G/DL (ref 6.1–7.9)
PROT SERPL-MCNC: 5.8 G/DL (ref 6.1–7.9)
PROT UR QL STRIP: ABNORMAL
PROT UR QL STRIP: NEGATIVE
PROTHROMBIN TIME: 18.5 SECONDS (ref 9.6–11.7)
PROTHROMBIN TIME: 19.6 SECONDS (ref 9.6–11.7)
PROTHROMBIN TIME: 21.3 SECONDS (ref 9.6–11.7)
PROTHROMBIN TIME: 21.7 SECONDS (ref 19.4–28.5)
PROTHROMBIN TIME: 21.7 SECONDS (ref 9.6–11.7)
PROTHROMBIN TIME: 23.3 SECONDS (ref 19.4–28.5)
PROTHROMBIN TIME: 28.4 SECONDS (ref 19.4–28.5)
PROTHROMBIN TIME: 40.4 SECONDS (ref 19.4–28.5)
PROTHROMBIN TIME: 50.2 SECONDS (ref 19.4–28.5)
PROTHROMBIN TIME: 52.3 SECONDS (ref 19.4–28.5)
PROTHROMBIN TIME: 55 SECONDS (ref 19.4–28.5)
PROTHROMBIN TIME: 75.1 SECONDS (ref 19.4–28.5)
RBC # BLD AUTO: 2.65 10*6/MM3 (ref 3.77–5.28)
RBC # BLD AUTO: 2.89 10*6/MM3 (ref 3.77–5.28)
RBC # BLD AUTO: 2.93 10*6/MM3 (ref 3.77–5.28)
RBC # BLD AUTO: 3 10*6/MM3 (ref 3.77–5.28)
RBC # BLD AUTO: 3.01 10*6/MM3 (ref 3.77–5.28)
RBC # BLD AUTO: 3.08 10*6/MM3 (ref 3.77–5.28)
RBC # BLD AUTO: 3.12 10*6/MM3 (ref 3.77–5.28)
RBC # BLD AUTO: 3.33 10*6/MM3 (ref 3.77–5.28)
RBC # BLD AUTO: 3.62 10*6/MM3 (ref 3.77–5.28)
RBC # BLD AUTO: 4.43 10*6/UL (ref 4–5.4)
RBC # UR: ABNORMAL /HPF
RBC # UR: ABNORMAL /HPF
REF LAB TEST METHOD: ABNORMAL
REF LAB TEST METHOD: ABNORMAL
RH BLD: POSITIVE
SCAN SLIDE: NORMAL
SODIUM BLD-SCNC: 134 MMOL/L (ref 136–144)
SODIUM BLD-SCNC: 136 MMOL/L (ref 136–144)
SODIUM BLD-SCNC: 137 MMOL/L (ref 136–144)
SODIUM BLD-SCNC: 139 MMOL/L (ref 136–144)
SODIUM BLD-SCNC: 141 MMOL/L (ref 136–144)
SODIUM BLD-SCNC: 141 MMOL/L (ref 136–144)
SODIUM SERPL-SCNC: 136 MMOL/L (ref 136–144)
SODIUM SERPL-SCNC: 137 MMOL/L (ref 136–144)
SODIUM UR-SCNC: 98 MMOL/L
SP GR UR STRIP: 1.01 (ref 1–1.03)
SP GR UR STRIP: 1.02 (ref 1–1.03)
SQUAMOUS #/AREA URNS HPF: ABNORMAL /HPF
SQUAMOUS #/AREA URNS HPF: ABNORMAL /HPF
T&S EXPIRATION DATE: NORMAL
TROPONIN I SERPL-MCNC: 0.03 NG/ML (ref 0–0.03)
TROPONIN I SERPL-MCNC: 0.04 NG/ML (ref 0–0.03)
TROPONIN I SERPL-MCNC: 0.04 NG/ML (ref 0–0.03)
TROPONIN I SERPL-MCNC: 0.05 NG/ML (ref 0–0.03)
UNIT  ABO: NORMAL
UNIT  RH: NORMAL
UROBILINOGEN UR QL STRIP: ABNORMAL
UROBILINOGEN UR QL STRIP: ABNORMAL
WBC # BLD AUTO: 6.1 10*3/UL (ref 4.5–11.5)
WBC MORPH BLD: NORMAL
WBC NRBC COR # BLD: 3.1 10*3/MM3 (ref 3.4–10.8)
WBC NRBC COR # BLD: 3.3 10*3/MM3 (ref 3.4–10.8)
WBC NRBC COR # BLD: 3.4 10*3/MM3 (ref 3.4–10.8)
WBC NRBC COR # BLD: 4.1 10*3/MM3 (ref 3.4–10.8)
WBC NRBC COR # BLD: 4.9 10*3/MM3 (ref 3.4–10.8)
WBC NRBC COR # BLD: 7.1 10*3/MM3 (ref 3.4–10.8)
WBC NRBC COR # BLD: 8.6 10*3/MM3 (ref 3.4–10.8)
WBC NRBC COR # BLD: 9 10*3/MM3 (ref 3.4–10.8)
WBC NRBC COR # BLD: 9.5 10*3/MM3 (ref 3.4–10.8)
WBC UR QL AUTO: ABNORMAL /HPF
WBC UR QL AUTO: ABNORMAL /HPF

## 2019-01-01 PROCEDURE — 85610 PROTHROMBIN TIME: CPT | Performed by: NURSE PRACTITIONER

## 2019-01-01 PROCEDURE — 99284 EMERGENCY DEPT VISIT MOD MDM: CPT

## 2019-01-01 PROCEDURE — 86850 RBC ANTIBODY SCREEN: CPT | Performed by: INTERNAL MEDICINE

## 2019-01-01 PROCEDURE — 86900 BLOOD TYPING SEROLOGIC ABO: CPT

## 2019-01-01 PROCEDURE — 85014 HEMATOCRIT: CPT | Performed by: NURSE PRACTITIONER

## 2019-01-01 PROCEDURE — 93005 ELECTROCARDIOGRAM TRACING: CPT | Performed by: INTERNAL MEDICINE

## 2019-01-01 PROCEDURE — 85018 HEMOGLOBIN: CPT | Performed by: NURSE PRACTITIONER

## 2019-01-01 PROCEDURE — 25010000002 PROPOFOL 10 MG/ML EMULSION: Performed by: ANESTHESIOLOGY

## 2019-01-01 PROCEDURE — 25010000002 LORAZEPAM PER 2 MG: Performed by: NURSE PRACTITIONER

## 2019-01-01 PROCEDURE — 85025 COMPLETE CBC W/AUTO DIFF WBC: CPT | Performed by: INTERNAL MEDICINE

## 2019-01-01 PROCEDURE — 83880 ASSAY OF NATRIURETIC PEPTIDE: CPT | Performed by: NURSE PRACTITIONER

## 2019-01-01 PROCEDURE — 85025 COMPLETE CBC W/AUTO DIFF WBC: CPT | Performed by: NURSE PRACTITIONER

## 2019-01-01 PROCEDURE — 76775 US EXAM ABDO BACK WALL LIM: CPT

## 2019-01-01 PROCEDURE — 93005 ELECTROCARDIOGRAM TRACING: CPT | Performed by: EMERGENCY MEDICINE

## 2019-01-01 PROCEDURE — 92610 EVALUATE SWALLOWING FUNCTION: CPT

## 2019-01-01 PROCEDURE — 80053 COMPREHEN METABOLIC PANEL: CPT | Performed by: NURSE PRACTITIONER

## 2019-01-01 PROCEDURE — 25010000002 FUROSEMIDE PER 20 MG: Performed by: HOSPITALIST

## 2019-01-01 PROCEDURE — 97116 GAIT TRAINING THERAPY: CPT

## 2019-01-01 PROCEDURE — 93000 ELECTROCARDIOGRAM COMPLETE: CPT | Performed by: INTERNAL MEDICINE

## 2019-01-01 PROCEDURE — 85025 COMPLETE CBC W/AUTO DIFF WBC: CPT | Performed by: EMERGENCY MEDICINE

## 2019-01-01 PROCEDURE — 80053 COMPREHEN METABOLIC PANEL: CPT | Performed by: INTERNAL MEDICINE

## 2019-01-01 PROCEDURE — 99214 OFFICE O/P EST MOD 30 MIN: CPT | Performed by: INTERNAL MEDICINE

## 2019-01-01 PROCEDURE — 99232 SBSQ HOSP IP/OBS MODERATE 35: CPT | Performed by: HOSPITALIST

## 2019-01-01 PROCEDURE — 83735 ASSAY OF MAGNESIUM: CPT | Performed by: NURSE PRACTITIONER

## 2019-01-01 PROCEDURE — 99222 1ST HOSP IP/OBS MODERATE 55: CPT | Performed by: INTERNAL MEDICINE

## 2019-01-01 PROCEDURE — 71045 X-RAY EXAM CHEST 1 VIEW: CPT

## 2019-01-01 PROCEDURE — 80048 BASIC METABOLIC PNL TOTAL CA: CPT

## 2019-01-01 PROCEDURE — P9612 CATHETERIZE FOR URINE SPEC: HCPCS

## 2019-01-01 PROCEDURE — 99213 OFFICE O/P EST LOW 20 MIN: CPT | Performed by: FAMILY MEDICINE

## 2019-01-01 PROCEDURE — C1751 CATH, INF, PER/CENT/MIDLINE: HCPCS

## 2019-01-01 PROCEDURE — 70450 CT HEAD/BRAIN W/O DYE: CPT

## 2019-01-01 PROCEDURE — 81001 URINALYSIS AUTO W/SCOPE: CPT | Performed by: INTERNAL MEDICINE

## 2019-01-01 PROCEDURE — 99238 HOSP IP/OBS DSCHRG MGMT 30/<: CPT | Performed by: INTERNAL MEDICINE

## 2019-01-01 PROCEDURE — 25010000002 FUROSEMIDE PER 20 MG: Performed by: INTERNAL MEDICINE

## 2019-01-01 PROCEDURE — 80048 BASIC METABOLIC PNL TOTAL CA: CPT | Performed by: EMERGENCY MEDICINE

## 2019-01-01 PROCEDURE — 82962 GLUCOSE BLOOD TEST: CPT

## 2019-01-01 PROCEDURE — 25010000002 FUROSEMIDE PER 20 MG: Performed by: EMERGENCY MEDICINE

## 2019-01-01 PROCEDURE — 80069 RENAL FUNCTION PANEL: CPT | Performed by: INTERNAL MEDICINE

## 2019-01-01 PROCEDURE — 84100 ASSAY OF PHOSPHORUS: CPT | Performed by: NURSE PRACTITIONER

## 2019-01-01 PROCEDURE — 25010000002 MORPHINE PER 10 MG: Performed by: NURSE PRACTITIONER

## 2019-01-01 PROCEDURE — 83735 ASSAY OF MAGNESIUM: CPT | Performed by: INTERNAL MEDICINE

## 2019-01-01 PROCEDURE — 25010000002 IRON SUCROSE PER 1 MG: Performed by: INTERNAL MEDICINE

## 2019-01-01 PROCEDURE — 99232 SBSQ HOSP IP/OBS MODERATE 35: CPT | Performed by: INTERNAL MEDICINE

## 2019-01-01 PROCEDURE — 83735 ASSAY OF MAGNESIUM: CPT

## 2019-01-01 PROCEDURE — 25010000002 ONDANSETRON PER 1 MG: Performed by: NURSE PRACTITIONER

## 2019-01-01 PROCEDURE — 63710000001 INSULIN REGULAR HUMAN PER 5 UNITS: Performed by: INTERNAL MEDICINE

## 2019-01-01 PROCEDURE — 86901 BLOOD TYPING SEROLOGIC RH(D): CPT | Performed by: INTERNAL MEDICINE

## 2019-01-01 PROCEDURE — 86901 BLOOD TYPING SEROLOGIC RH(D): CPT

## 2019-01-01 PROCEDURE — 86900 BLOOD TYPING SEROLOGIC ABO: CPT | Performed by: INTERNAL MEDICINE

## 2019-01-01 PROCEDURE — 82330 ASSAY OF CALCIUM: CPT | Performed by: INTERNAL MEDICINE

## 2019-01-01 PROCEDURE — 99221 1ST HOSP IP/OBS SF/LOW 40: CPT | Performed by: NURSE PRACTITIONER

## 2019-01-01 PROCEDURE — 81001 URINALYSIS AUTO W/SCOPE: CPT | Performed by: NURSE PRACTITIONER

## 2019-01-01 PROCEDURE — 99222 1ST HOSP IP/OBS MODERATE 55: CPT | Performed by: NURSE PRACTITIONER

## 2019-01-01 PROCEDURE — 85610 PROTHROMBIN TIME: CPT | Performed by: INTERNAL MEDICINE

## 2019-01-01 PROCEDURE — 93005 ELECTROCARDIOGRAM TRACING: CPT | Performed by: NURSE PRACTITIONER

## 2019-01-01 PROCEDURE — 85610 PROTHROMBIN TIME: CPT | Performed by: EMERGENCY MEDICINE

## 2019-01-01 PROCEDURE — 84484 ASSAY OF TROPONIN QUANT: CPT | Performed by: NURSE PRACTITIONER

## 2019-01-01 PROCEDURE — 0DJ08ZZ INSPECTION OF UPPER INTESTINAL TRACT, VIA NATURAL OR ARTIFICIAL OPENING ENDOSCOPIC: ICD-10-PCS | Performed by: INTERNAL MEDICINE

## 2019-01-01 PROCEDURE — 82570 ASSAY OF URINE CREATININE: CPT | Performed by: INTERNAL MEDICINE

## 2019-01-01 PROCEDURE — 72125 CT NECK SPINE W/O DYE: CPT

## 2019-01-01 PROCEDURE — 84100 ASSAY OF PHOSPHORUS: CPT | Performed by: INTERNAL MEDICINE

## 2019-01-01 PROCEDURE — 99223 1ST HOSP IP/OBS HIGH 75: CPT | Performed by: NURSE PRACTITIONER

## 2019-01-01 PROCEDURE — 36430 TRANSFUSION BLD/BLD COMPNT: CPT

## 2019-01-01 PROCEDURE — 85014 HEMATOCRIT: CPT | Performed by: INTERNAL MEDICINE

## 2019-01-01 PROCEDURE — 85610 PROTHROMBIN TIME: CPT | Performed by: HOSPITALIST

## 2019-01-01 PROCEDURE — 86927 PLASMA FRESH FROZEN: CPT

## 2019-01-01 PROCEDURE — 99223 1ST HOSP IP/OBS HIGH 75: CPT | Performed by: INTERNAL MEDICINE

## 2019-01-01 PROCEDURE — 63710000001 INSULIN REGULAR HUMAN PER 5 UNITS: Performed by: NURSE PRACTITIONER

## 2019-01-01 PROCEDURE — 25010000002 VITAMIN K1 PER 1 MG

## 2019-01-01 PROCEDURE — 43235 EGD DIAGNOSTIC BRUSH WASH: CPT | Performed by: INTERNAL MEDICINE

## 2019-01-01 PROCEDURE — 97161 PT EVAL LOW COMPLEX 20 MIN: CPT

## 2019-01-01 PROCEDURE — 84484 ASSAY OF TROPONIN QUANT: CPT | Performed by: EMERGENCY MEDICINE

## 2019-01-01 PROCEDURE — 83735 ASSAY OF MAGNESIUM: CPT | Performed by: HOSPITALIST

## 2019-01-01 PROCEDURE — 99239 HOSP IP/OBS DSCHRG MGMT >30: CPT | Performed by: HOSPITALIST

## 2019-01-01 PROCEDURE — 25010000002 VITAMIN K1 PER 1 MG: Performed by: NURSE PRACTITIONER

## 2019-01-01 PROCEDURE — 85007 BL SMEAR W/DIFF WBC COUNT: CPT | Performed by: NURSE PRACTITIONER

## 2019-01-01 PROCEDURE — 87081 CULTURE SCREEN ONLY: CPT | Performed by: NURSE PRACTITIONER

## 2019-01-01 PROCEDURE — 93306 TTE W/DOPPLER COMPLETE: CPT

## 2019-01-01 PROCEDURE — 80048 BASIC METABOLIC PNL TOTAL CA: CPT | Performed by: NURSE PRACTITIONER

## 2019-01-01 PROCEDURE — 25010000002 VITAMIN K1 PER 1 MG: Performed by: HOSPITALIST

## 2019-01-01 PROCEDURE — 70486 CT MAXILLOFACIAL W/O DYE: CPT

## 2019-01-01 PROCEDURE — 99233 SBSQ HOSP IP/OBS HIGH 50: CPT | Performed by: INTERNAL MEDICINE

## 2019-01-01 PROCEDURE — P9017 PLASMA 1 DONOR FRZ W/IN 8 HR: HCPCS

## 2019-01-01 PROCEDURE — 93306 TTE W/DOPPLER COMPLETE: CPT | Performed by: INTERNAL MEDICINE

## 2019-01-01 PROCEDURE — 85018 HEMOGLOBIN: CPT | Performed by: INTERNAL MEDICINE

## 2019-01-01 PROCEDURE — 83880 ASSAY OF NATRIURETIC PEPTIDE: CPT

## 2019-01-01 PROCEDURE — 83880 ASSAY OF NATRIURETIC PEPTIDE: CPT | Performed by: EMERGENCY MEDICINE

## 2019-01-01 PROCEDURE — 84300 ASSAY OF URINE SODIUM: CPT | Performed by: INTERNAL MEDICINE

## 2019-01-01 PROCEDURE — 02H633Z INSERTION OF INFUSION DEVICE INTO RIGHT ATRIUM, PERCUTANEOUS APPROACH: ICD-10-PCS | Performed by: INTERNAL MEDICINE

## 2019-01-01 RX ORDER — CALCIUM GLUCONATE 20 MG/ML
1 INJECTION, SOLUTION INTRAVENOUS ONCE
Status: DISCONTINUED | OUTPATIENT
Start: 2019-01-01 | End: 2019-01-01

## 2019-01-01 RX ORDER — MORPHINE SULFATE 4 MG/ML
2 INJECTION, SOLUTION INTRAMUSCULAR; INTRAVENOUS
Status: DISCONTINUED | OUTPATIENT
Start: 2019-01-01 | End: 2019-01-01 | Stop reason: HOSPADM

## 2019-01-01 RX ORDER — FUROSEMIDE 40 MG/1
40 TABLET ORAL DAILY
Refills: 5 | COMMUNITY
Start: 2019-01-01

## 2019-01-01 RX ORDER — WARFARIN SODIUM 2 MG/1
2 TABLET ORAL NIGHTLY
Refills: 0 | COMMUNITY
Start: 2019-01-01

## 2019-01-01 RX ORDER — DEXTROSE MONOHYDRATE 25 G/50ML
12.5 INJECTION, SOLUTION INTRAVENOUS AS NEEDED
Status: CANCELLED | OUTPATIENT
Start: 2019-01-01

## 2019-01-01 RX ORDER — PANTOPRAZOLE SODIUM 40 MG/1
40 TABLET, DELAYED RELEASE ORAL
Status: DISCONTINUED | OUTPATIENT
Start: 2019-01-01 | End: 2019-01-01 | Stop reason: HOSPADM

## 2019-01-01 RX ORDER — FLUMAZENIL 0.1 MG/ML
0.2 INJECTION INTRAVENOUS AS NEEDED
Status: CANCELLED | OUTPATIENT
Start: 2019-01-01

## 2019-01-01 RX ORDER — FERROUS SULFATE TAB EC 324 MG (65 MG FE EQUIVALENT) 324 (65 FE) MG
324 TABLET DELAYED RESPONSE ORAL
Status: DISCONTINUED | OUTPATIENT
Start: 2019-01-01 | End: 2019-01-01 | Stop reason: HOSPADM

## 2019-01-01 RX ORDER — SODIUM CHLORIDE 0.9 % (FLUSH) 0.9 %
10 SYRINGE (ML) INJECTION AS NEEDED
Status: DISCONTINUED | OUTPATIENT
Start: 2019-01-01 | End: 2019-01-01 | Stop reason: HOSPADM

## 2019-01-01 RX ORDER — SODIUM CHLORIDE 0.9 % (FLUSH) 0.9 %
10 SYRINGE (ML) INJECTION EVERY 12 HOURS SCHEDULED
Status: DISCONTINUED | OUTPATIENT
Start: 2019-01-01 | End: 2019-01-01

## 2019-01-01 RX ORDER — PANTOPRAZOLE SODIUM 40 MG/10ML
40 INJECTION, POWDER, LYOPHILIZED, FOR SOLUTION INTRAVENOUS
Status: DISCONTINUED | OUTPATIENT
Start: 2019-01-01 | End: 2019-01-01

## 2019-01-01 RX ORDER — PHENYLEPHRINE HCL IN 0.9% NACL 0.5 MG/5ML
.5-3 SYRINGE (ML) INTRAVENOUS
Status: CANCELLED | OUTPATIENT
Start: 2019-01-01

## 2019-01-01 RX ORDER — NALBUPHINE HCL 10 MG/ML
10 AMPUL (ML) INJECTION EVERY 4 HOURS PRN
Status: CANCELLED | OUTPATIENT
Start: 2019-01-01

## 2019-01-01 RX ORDER — PROMETHAZINE HYDROCHLORIDE 25 MG/1
25 SUPPOSITORY RECTAL ONCE AS NEEDED
Status: CANCELLED | OUTPATIENT
Start: 2019-01-01

## 2019-01-01 RX ORDER — SODIUM CHLORIDE 0.9 % (FLUSH) 0.9 %
3-10 SYRINGE (ML) INJECTION AS NEEDED
Status: CANCELLED | OUTPATIENT
Start: 2019-01-01

## 2019-01-01 RX ORDER — FUROSEMIDE 10 MG/ML
40 INJECTION INTRAMUSCULAR; INTRAVENOUS ONCE
Status: COMPLETED | OUTPATIENT
Start: 2019-01-01 | End: 2019-01-01

## 2019-01-01 RX ORDER — SODIUM CHLORIDE 0.9 % (FLUSH) 0.9 %
3-10 SYRINGE (ML) INJECTION AS NEEDED
Status: DISCONTINUED | OUTPATIENT
Start: 2019-01-01 | End: 2019-01-01 | Stop reason: HOSPADM

## 2019-01-01 RX ORDER — LEVOTHYROXINE SODIUM 0.05 MG/1
50 TABLET ORAL DAILY
Status: DISCONTINUED | OUTPATIENT
Start: 2019-01-01 | End: 2019-01-01 | Stop reason: HOSPADM

## 2019-01-01 RX ORDER — GLYCOPYRROLATE 0.2 MG/ML
0.1 INJECTION INTRAMUSCULAR; INTRAVENOUS ONCE
Status: COMPLETED | OUTPATIENT
Start: 2019-01-01 | End: 2019-01-01

## 2019-01-01 RX ORDER — DILTIAZEM HYDROCHLORIDE 240 MG/1
240 CAPSULE, COATED, EXTENDED RELEASE ORAL DAILY
Status: DISCONTINUED | OUTPATIENT
Start: 2019-01-01 | End: 2019-01-01

## 2019-01-01 RX ORDER — ONDANSETRON 2 MG/ML
4 INJECTION INTRAMUSCULAR; INTRAVENOUS EVERY 6 HOURS PRN
Status: DISCONTINUED | OUTPATIENT
Start: 2019-01-01 | End: 2019-01-01 | Stop reason: HOSPADM

## 2019-01-01 RX ORDER — SODIUM BICARBONATE 650 MG/1
650 TABLET ORAL 3 TIMES DAILY
Status: DISCONTINUED | OUTPATIENT
Start: 2019-01-01 | End: 2019-01-01 | Stop reason: HOSPADM

## 2019-01-01 RX ORDER — CALCIUM GLUCONATE 20 MG/ML
1 INJECTION, SOLUTION INTRAVENOUS ONCE
Status: COMPLETED | OUTPATIENT
Start: 2019-01-01 | End: 2019-01-01

## 2019-01-01 RX ORDER — GLYCOPYRROLATE 0.2 MG/ML
0.1 INJECTION INTRAMUSCULAR; INTRAVENOUS
Status: DISCONTINUED | OUTPATIENT
Start: 2019-01-01 | End: 2019-01-01 | Stop reason: HOSPADM

## 2019-01-01 RX ORDER — FERROUS SULFATE TAB EC 324 MG (65 MG FE EQUIVALENT) 324 (65 FE) MG
324 TABLET DELAYED RESPONSE ORAL
Status: DISCONTINUED | OUTPATIENT
Start: 2019-01-01 | End: 2019-01-01

## 2019-01-01 RX ORDER — DILTIAZEM HYDROCHLORIDE 240 MG/1
240 CAPSULE, COATED, EXTENDED RELEASE ORAL DAILY
COMMUNITY
End: 2019-01-01

## 2019-01-01 RX ORDER — POTASSIUM CHLORIDE 20 MEQ/1
20 TABLET, EXTENDED RELEASE ORAL DAILY
COMMUNITY

## 2019-01-01 RX ORDER — SODIUM CHLORIDE 0.9 % (FLUSH) 0.9 %
20 SYRINGE (ML) INJECTION AS NEEDED
Status: DISCONTINUED | OUTPATIENT
Start: 2019-01-01 | End: 2019-01-01

## 2019-01-01 RX ORDER — PANTOPRAZOLE SODIUM 40 MG/1
40 TABLET, DELAYED RELEASE ORAL
Status: DISCONTINUED | OUTPATIENT
Start: 2019-01-01 | End: 2019-01-01

## 2019-01-01 RX ORDER — BUMETANIDE 0.25 MG/ML
2 INJECTION INTRAMUSCULAR; INTRAVENOUS ONCE
Status: COMPLETED | OUTPATIENT
Start: 2019-01-01 | End: 2019-01-01

## 2019-01-01 RX ORDER — HYDROMORPHONE HCL 110MG/55ML
0.5 PATIENT CONTROLLED ANALGESIA SYRINGE INTRAVENOUS
Status: CANCELLED | OUTPATIENT
Start: 2019-01-01

## 2019-01-01 RX ORDER — PANTOPRAZOLE SODIUM 40 MG/10ML
40 INJECTION, POWDER, LYOPHILIZED, FOR SOLUTION INTRAVENOUS EVERY 12 HOURS SCHEDULED
Status: DISCONTINUED | OUTPATIENT
Start: 2019-01-01 | End: 2019-01-01

## 2019-01-01 RX ORDER — SODIUM BICARBONATE 650 MG/1
TABLET ORAL
Status: DISPENSED
Start: 2019-01-01 | End: 2019-01-01

## 2019-01-01 RX ORDER — PROPOFOL 10 MG/ML
VIAL (ML) INTRAVENOUS AS NEEDED
Status: DISCONTINUED | OUTPATIENT
Start: 2019-01-01 | End: 2019-01-01 | Stop reason: SURG

## 2019-01-01 RX ORDER — MEPERIDINE HYDROCHLORIDE 25 MG/ML
12.5 INJECTION INTRAMUSCULAR; INTRAVENOUS; SUBCUTANEOUS
Status: CANCELLED | OUTPATIENT
Start: 2019-01-01 | End: 2019-01-01

## 2019-01-01 RX ORDER — DEXTROSE MONOHYDRATE 25 G/50ML
50 INJECTION, SOLUTION INTRAVENOUS ONCE
Status: COMPLETED | OUTPATIENT
Start: 2019-01-01 | End: 2019-01-01

## 2019-01-01 RX ORDER — CARVEDILOL 3.12 MG/1
3.12 TABLET ORAL 2 TIMES DAILY WITH MEALS
COMMUNITY

## 2019-01-01 RX ORDER — WARFARIN SODIUM 3 MG/1
3 TABLET ORAL
Status: DISCONTINUED | OUTPATIENT
Start: 2019-01-01 | End: 2019-01-01 | Stop reason: HOSPADM

## 2019-01-01 RX ORDER — ALBUTEROL SULFATE 2.5 MG/3ML
2.5 SOLUTION RESPIRATORY (INHALATION) ONCE AS NEEDED
Status: CANCELLED | OUTPATIENT
Start: 2019-01-01

## 2019-01-01 RX ORDER — IPRATROPIUM BROMIDE AND ALBUTEROL SULFATE 2.5; .5 MG/3ML; MG/3ML
3 SOLUTION RESPIRATORY (INHALATION) EVERY 4 HOURS PRN
Status: DISCONTINUED | OUTPATIENT
Start: 2019-01-01 | End: 2019-01-01 | Stop reason: HOSPADM

## 2019-01-01 RX ORDER — ACETAMINOPHEN 325 MG/1
650 TABLET ORAL ONCE
Status: DISCONTINUED | OUTPATIENT
Start: 2019-01-01 | End: 2019-01-01

## 2019-01-01 RX ORDER — ONDANSETRON 2 MG/ML
4 INJECTION INTRAMUSCULAR; INTRAVENOUS ONCE AS NEEDED
Status: CANCELLED | OUTPATIENT
Start: 2019-01-01

## 2019-01-01 RX ORDER — DIPHENHYDRAMINE HYDROCHLORIDE 50 MG/ML
12.5 INJECTION INTRAMUSCULAR; INTRAVENOUS
Status: CANCELLED | OUTPATIENT
Start: 2019-01-01

## 2019-01-01 RX ORDER — HYDRALAZINE HYDROCHLORIDE 20 MG/ML
5 INJECTION INTRAMUSCULAR; INTRAVENOUS
Status: CANCELLED | OUTPATIENT
Start: 2019-01-01

## 2019-01-01 RX ORDER — SODIUM CHLORIDE 9 MG/ML
INJECTION, SOLUTION INTRAVENOUS CONTINUOUS PRN
Status: DISCONTINUED | OUTPATIENT
Start: 2019-01-01 | End: 2019-01-01 | Stop reason: SURG

## 2019-01-01 RX ORDER — LORAZEPAM 2 MG/ML
1 INJECTION INTRAMUSCULAR
Status: DISCONTINUED | OUTPATIENT
Start: 2019-01-01 | End: 2019-01-01 | Stop reason: HOSPADM

## 2019-01-01 RX ORDER — NALOXONE HCL 0.4 MG/ML
0.4 VIAL (ML) INJECTION AS NEEDED
Status: CANCELLED | OUTPATIENT
Start: 2019-01-01

## 2019-01-01 RX ORDER — FERROUS FUMARATE 324(106)MG
324 TABLET ORAL DAILY
Refills: 5 | COMMUNITY
Start: 2019-01-01

## 2019-01-01 RX ORDER — ATROPINE SULFATE 1 MG/ML
0.5 INJECTION, SOLUTION INTRAMUSCULAR; INTRAVENOUS; SUBCUTANEOUS ONCE AS NEEDED
Status: CANCELLED | OUTPATIENT
Start: 2019-01-01

## 2019-01-01 RX ORDER — NOREPINEPHRINE BIT/0.9 % NACL 8 MG/250ML
.02-.5 INFUSION BOTTLE (ML) INTRAVENOUS
Status: DISCONTINUED | OUTPATIENT
Start: 2019-01-01 | End: 2019-01-01

## 2019-01-01 RX ORDER — ACETAMINOPHEN AND CODEINE PHOSPHATE 300; 30 MG/1; MG/1
1 TABLET ORAL EVERY 6 HOURS PRN
Qty: 15 TABLET | Refills: 0 | Status: SHIPPED | OUTPATIENT
Start: 2019-01-01

## 2019-01-01 RX ORDER — PROMETHAZINE HYDROCHLORIDE 25 MG/1
12.5 TABLET ORAL EVERY 6 HOURS PRN
Status: DISCONTINUED | OUTPATIENT
Start: 2019-01-01 | End: 2019-01-01 | Stop reason: HOSPADM

## 2019-01-01 RX ORDER — LORAZEPAM 2 MG/ML
1 INJECTION INTRAMUSCULAR
Status: CANCELLED | OUTPATIENT
Start: 2019-01-01

## 2019-01-01 RX ORDER — LIDOCAINE HYDROCHLORIDE 10 MG/ML
INJECTION, SOLUTION EPIDURAL; INFILTRATION; INTRACAUDAL; PERINEURAL AS NEEDED
Status: DISCONTINUED | OUTPATIENT
Start: 2019-01-01 | End: 2019-01-01 | Stop reason: SURG

## 2019-01-01 RX ORDER — SODIUM CHLORIDE 0.9 % (FLUSH) 0.9 %
3-10 SYRINGE (ML) INJECTION AS NEEDED
Status: DISCONTINUED | OUTPATIENT
Start: 2019-01-01 | End: 2019-01-01

## 2019-01-01 RX ORDER — LEVOTHYROXINE SODIUM 0.05 MG/1
50 TABLET ORAL DAILY
COMMUNITY

## 2019-01-01 RX ORDER — CARVEDILOL 6.25 MG/1
6.25 TABLET ORAL EVERY 12 HOURS SCHEDULED
Status: DISCONTINUED | OUTPATIENT
Start: 2019-01-01 | End: 2019-01-01

## 2019-01-01 RX ORDER — WARFARIN SODIUM 3 MG/1
3 TABLET ORAL
COMMUNITY
Start: 2013-10-29 | End: 2019-01-01 | Stop reason: HOSPADM

## 2019-01-01 RX ORDER — SODIUM CHLORIDE 0.9 % (FLUSH) 0.9 %
3 SYRINGE (ML) INJECTION EVERY 12 HOURS SCHEDULED
Status: CANCELLED | OUTPATIENT
Start: 2019-01-01

## 2019-01-01 RX ORDER — SODIUM CHLORIDE 9 MG/ML
50 INJECTION, SOLUTION INTRAVENOUS CONTINUOUS
Status: DISCONTINUED | OUTPATIENT
Start: 2019-01-01 | End: 2019-01-01

## 2019-01-01 RX ORDER — SODIUM CHLORIDE 9 MG/ML
9 INJECTION, SOLUTION INTRAVENOUS CONTINUOUS PRN
Status: CANCELLED | OUTPATIENT
Start: 2019-01-01

## 2019-01-01 RX ORDER — LABETALOL HYDROCHLORIDE 5 MG/ML
5 INJECTION, SOLUTION INTRAVENOUS
Status: CANCELLED | OUTPATIENT
Start: 2019-01-01

## 2019-01-01 RX ORDER — ACETAMINOPHEN 650 MG/1
650 SUPPOSITORY RECTAL EVERY 4 HOURS PRN
Status: DISCONTINUED | OUTPATIENT
Start: 2019-01-01 | End: 2019-01-01 | Stop reason: HOSPADM

## 2019-01-01 RX ORDER — MIDAZOLAM HYDROCHLORIDE 1 MG/ML
1 INJECTION INTRAMUSCULAR; INTRAVENOUS
Status: CANCELLED | OUTPATIENT
Start: 2019-01-01

## 2019-01-01 RX ORDER — WARFARIN SODIUM 4 MG/1
4 TABLET ORAL 3 TIMES WEEKLY
COMMUNITY
End: 2019-01-01 | Stop reason: HOSPADM

## 2019-01-01 RX ORDER — CALCIUM CARBONATE 200(500)MG
2 TABLET,CHEWABLE ORAL 3 TIMES DAILY PRN
Status: DISCONTINUED | OUTPATIENT
Start: 2019-01-01 | End: 2019-01-01 | Stop reason: HOSPADM

## 2019-01-01 RX ORDER — SODIUM CHLORIDE 0.9 % (FLUSH) 0.9 %
10 SYRINGE (ML) INJECTION AS NEEDED
Status: DISCONTINUED | OUTPATIENT
Start: 2019-01-01 | End: 2019-01-01

## 2019-01-01 RX ORDER — ONDANSETRON 4 MG/1
4 TABLET, FILM COATED ORAL EVERY 6 HOURS PRN
Status: CANCELLED | OUTPATIENT
Start: 2019-01-01

## 2019-01-01 RX ORDER — CARVEDILOL 3.12 MG/1
3.12 TABLET ORAL 2 TIMES DAILY WITH MEALS
Status: DISCONTINUED | OUTPATIENT
Start: 2019-01-01 | End: 2019-01-01 | Stop reason: HOSPADM

## 2019-01-01 RX ORDER — BISACODYL 5 MG/1
5 TABLET, DELAYED RELEASE ORAL DAILY PRN
Status: DISCONTINUED | OUTPATIENT
Start: 2019-01-01 | End: 2019-01-01 | Stop reason: HOSPADM

## 2019-01-01 RX ORDER — WARFARIN SODIUM 3 MG/1
3 TABLET ORAL NIGHTLY
Qty: 3 TABLET | Refills: 0 | Status: SHIPPED | OUTPATIENT
Start: 2019-01-01 | End: 2019-01-01

## 2019-01-01 RX ORDER — PHYTONADIONE 10 MG/ML
INJECTION, EMULSION INTRAMUSCULAR; INTRAVENOUS; SUBCUTANEOUS
Status: COMPLETED
Start: 2019-01-01 | End: 2019-01-01

## 2019-01-01 RX ORDER — HYDROMORPHONE HCL 110MG/55ML
0.5 PATIENT CONTROLLED ANALGESIA SYRINGE INTRAVENOUS
Status: CANCELLED | OUTPATIENT
Start: 2019-01-01 | End: 2019-08-26

## 2019-01-01 RX ORDER — WARFARIN SODIUM 2 MG/1
TABLET ORAL
Qty: 60 TABLET | Refills: 0 | Status: SHIPPED | OUTPATIENT
Start: 2019-01-01 | End: 2019-01-01

## 2019-01-01 RX ORDER — DIPHENHYDRAMINE HCL 25 MG
50 TABLET ORAL ONCE
Status: DISCONTINUED | OUTPATIENT
Start: 2019-01-01 | End: 2019-01-01

## 2019-01-01 RX ORDER — FUROSEMIDE 10 MG/ML
20 INJECTION INTRAMUSCULAR; INTRAVENOUS DAILY
Status: DISCONTINUED | OUTPATIENT
Start: 2019-01-01 | End: 2019-01-01

## 2019-01-01 RX ORDER — DILTIAZEM HYDROCHLORIDE 240 MG/1
240 CAPSULE, COATED, EXTENDED RELEASE ORAL DAILY
Refills: 3 | COMMUNITY
Start: 2019-01-01 | End: 2019-01-01 | Stop reason: HOSPADM

## 2019-01-01 RX ORDER — FUROSEMIDE 10 MG/ML
20 INJECTION INTRAMUSCULAR; INTRAVENOUS EVERY 12 HOURS
Status: DISCONTINUED | OUTPATIENT
Start: 2019-01-01 | End: 2019-01-01

## 2019-01-01 RX ORDER — NALBUPHINE HCL 10 MG/ML
2 AMPUL (ML) INJECTION EVERY 4 HOURS PRN
Status: CANCELLED | OUTPATIENT
Start: 2019-01-01

## 2019-01-01 RX ORDER — EPHEDRINE SULFATE 50 MG/ML
5 INJECTION, SOLUTION INTRAVENOUS ONCE AS NEEDED
Status: CANCELLED | OUTPATIENT
Start: 2019-01-01

## 2019-01-01 RX ORDER — CARVEDILOL 3.12 MG/1
3.12 TABLET ORAL 2 TIMES DAILY WITH MEALS
Qty: 15 TABLET | Refills: 0 | Status: SHIPPED | OUTPATIENT
Start: 2019-01-01 | End: 2019-01-01 | Stop reason: SDUPTHER

## 2019-01-01 RX ORDER — FUROSEMIDE 40 MG/1
TABLET ORAL
Qty: 30 TABLET | Refills: 5 | Status: SHIPPED | OUTPATIENT
Start: 2019-01-01 | End: 2019-01-01

## 2019-01-01 RX ORDER — BUMETANIDE 0.25 MG/ML
3 INJECTION INTRAMUSCULAR; INTRAVENOUS ONCE
Status: COMPLETED | OUTPATIENT
Start: 2019-01-01 | End: 2019-01-01

## 2019-01-01 RX ORDER — FUROSEMIDE 10 MG/ML
40 INJECTION INTRAMUSCULAR; INTRAVENOUS DAILY
Status: DISCONTINUED | OUTPATIENT
Start: 2019-01-01 | End: 2019-01-01 | Stop reason: HOSPADM

## 2019-01-01 RX ORDER — FUROSEMIDE 10 MG/ML
40 INJECTION INTRAMUSCULAR; INTRAVENOUS
Status: DISCONTINUED | OUTPATIENT
Start: 2019-01-01 | End: 2019-01-01

## 2019-01-01 RX ORDER — PHYTONADIONE 2 MG/ML
2.5 INJECTION, EMULSION INTRAMUSCULAR; INTRAVENOUS; SUBCUTANEOUS ONCE
Status: COMPLETED | OUTPATIENT
Start: 2019-01-01 | End: 2019-01-01

## 2019-01-01 RX ORDER — LORAZEPAM 2 MG/ML
1 INJECTION INTRAMUSCULAR
Status: CANCELLED | OUTPATIENT
Start: 2019-01-01 | End: 2019-08-26

## 2019-01-01 RX ORDER — PROMETHAZINE HYDROCHLORIDE 25 MG/ML
6.25 INJECTION, SOLUTION INTRAMUSCULAR; INTRAVENOUS ONCE AS NEEDED
Status: CANCELLED | OUTPATIENT
Start: 2019-01-01

## 2019-01-01 RX ORDER — POTASSIUM CHLORIDE 20 MEQ/1
20 TABLET, EXTENDED RELEASE ORAL DAILY
Status: DISCONTINUED | OUTPATIENT
Start: 2019-01-01 | End: 2019-01-01 | Stop reason: HOSPADM

## 2019-01-01 RX ORDER — DOCUSATE SODIUM 100 MG/1
100 CAPSULE, LIQUID FILLED ORAL 2 TIMES DAILY PRN
Status: DISCONTINUED | OUTPATIENT
Start: 2019-01-01 | End: 2019-01-01 | Stop reason: HOSPADM

## 2019-01-01 RX ORDER — SODIUM CHLORIDE 0.9 % (FLUSH) 0.9 %
3 SYRINGE (ML) INJECTION EVERY 12 HOURS SCHEDULED
Status: DISCONTINUED | OUTPATIENT
Start: 2019-01-01 | End: 2019-01-01

## 2019-01-01 RX ORDER — ONDANSETRON 2 MG/ML
4 INJECTION INTRAMUSCULAR; INTRAVENOUS EVERY 6 HOURS PRN
Status: CANCELLED | OUTPATIENT
Start: 2019-01-01

## 2019-01-01 RX ORDER — SODIUM CHLORIDE 0.9 % (FLUSH) 0.9 %
3 SYRINGE (ML) INJECTION EVERY 12 HOURS SCHEDULED
Status: DISCONTINUED | OUTPATIENT
Start: 2019-01-01 | End: 2019-01-01 | Stop reason: HOSPADM

## 2019-01-01 RX ORDER — BUMETANIDE 0.25 MG/ML
2 INJECTION INTRAMUSCULAR; INTRAVENOUS EVERY 12 HOURS
Status: DISCONTINUED | OUTPATIENT
Start: 2019-01-01 | End: 2019-01-01

## 2019-01-01 RX ORDER — MELATONIN
1000 DAILY
COMMUNITY
End: 2019-01-01

## 2019-01-01 RX ORDER — LEVOTHYROXINE SODIUM 0.05 MG/1
50 TABLET ORAL DAILY
Status: DISCONTINUED | OUTPATIENT
Start: 2019-01-01 | End: 2019-01-01

## 2019-01-01 RX ORDER — CARVEDILOL 3.12 MG/1
3.12 TABLET ORAL 2 TIMES DAILY WITH MEALS
Qty: 180 TABLET | Refills: 3 | Status: SHIPPED | OUTPATIENT
Start: 2019-01-01 | End: 2019-01-01 | Stop reason: SDUPTHER

## 2019-01-01 RX ORDER — MAGNESIUM HYDROXIDE/ALUMINUM HYDROXICE/SIMETHICONE 120; 1200; 1200 MG/30ML; MG/30ML; MG/30ML
30 SUSPENSION ORAL EVERY 6 HOURS PRN
Status: DISCONTINUED | OUTPATIENT
Start: 2019-01-01 | End: 2019-01-01 | Stop reason: HOSPADM

## 2019-01-01 RX ORDER — CARVEDILOL 3.12 MG/1
TABLET ORAL
Qty: 180 TABLET | Refills: 3 | Status: SHIPPED | OUTPATIENT
Start: 2019-01-01 | End: 2019-01-01

## 2019-01-01 RX ORDER — PROMETHAZINE HYDROCHLORIDE 25 MG/1
25 TABLET ORAL ONCE AS NEEDED
Status: CANCELLED | OUTPATIENT
Start: 2019-01-01

## 2019-01-01 RX ORDER — PANTOPRAZOLE SODIUM 40 MG/1
40 TABLET, DELAYED RELEASE ORAL DAILY
Refills: 5 | COMMUNITY
Start: 2019-01-01

## 2019-01-01 RX ORDER — ACETAMINOPHEN 160 MG/5ML
650 SOLUTION ORAL EVERY 4 HOURS PRN
Status: DISCONTINUED | OUTPATIENT
Start: 2019-01-01 | End: 2019-01-01 | Stop reason: HOSPADM

## 2019-01-01 RX ADMIN — Medication 10 ML: at 08:23

## 2019-01-01 RX ADMIN — SODIUM BICARBONATE 650 MG TABLET 650 MG: at 20:40

## 2019-01-01 RX ADMIN — Medication 10 ML: at 08:31

## 2019-01-01 RX ADMIN — NOREPINEPHRINE BITARTRATE 0.26 MCG/KG/MIN: 1 INJECTION, SOLUTION, CONCENTRATE INTRAVENOUS at 13:56

## 2019-01-01 RX ADMIN — POTASSIUM CHLORIDE 20 MEQ: 1500 TABLET, EXTENDED RELEASE ORAL at 07:49

## 2019-01-01 RX ADMIN — PANTOPRAZOLE SODIUM 40 MG: 40 TABLET, DELAYED RELEASE ORAL at 07:41

## 2019-01-01 RX ADMIN — SODIUM BICARBONATE 650 MG TABLET 650 MG: at 16:13

## 2019-01-01 RX ADMIN — FERROUS SULFATE TAB EC 324 MG (65 MG FE EQUIVALENT) 324 MG: 324 (65 FE) TABLET DELAYED RESPONSE at 07:40

## 2019-01-01 RX ADMIN — BUMETANIDE 2 MG: 0.25 INJECTION INTRAMUSCULAR; INTRAVENOUS at 08:31

## 2019-01-01 RX ADMIN — PANTOPRAZOLE SODIUM 40 MG: 40 TABLET, DELAYED RELEASE ORAL at 10:03

## 2019-01-01 RX ADMIN — MORPHINE SULFATE 2 MG: 4 INJECTION INTRAVENOUS at 01:37

## 2019-01-01 RX ADMIN — FUROSEMIDE 40 MG: 10 INJECTION, SOLUTION INTRAVENOUS at 17:42

## 2019-01-01 RX ADMIN — Medication 10 ML: at 08:44

## 2019-01-01 RX ADMIN — LORAZEPAM 1 MG: 2 INJECTION INTRAMUSCULAR; INTRAVENOUS at 16:15

## 2019-01-01 RX ADMIN — DEXTROSE 50 % IN WATER (D50W) INTRAVENOUS SYRINGE 50 ML: at 22:06

## 2019-01-01 RX ADMIN — Medication 10 ML: at 23:44

## 2019-01-01 RX ADMIN — MORPHINE SULFATE 2 MG: 4 INJECTION INTRAVENOUS at 22:21

## 2019-01-01 RX ADMIN — GLYCOPYRROLATE 0.1 MG: 0.2 INJECTION, SOLUTION INTRAMUSCULAR; INTRAVENOUS at 05:47

## 2019-01-01 RX ADMIN — Medication 10 ML: at 20:40

## 2019-01-01 RX ADMIN — FUROSEMIDE 20 MG: 10 INJECTION, SOLUTION INTRAVENOUS at 10:02

## 2019-01-01 RX ADMIN — LEVOTHYROXINE SODIUM 50 MCG: 50 TABLET ORAL at 05:02

## 2019-01-01 RX ADMIN — LEVOTHYROXINE SODIUM 50 MCG: 50 TABLET ORAL at 05:57

## 2019-01-01 RX ADMIN — SODIUM BICARBONATE 650 MG TABLET 650 MG: at 20:04

## 2019-01-01 RX ADMIN — Medication 10 ML: at 08:22

## 2019-01-01 RX ADMIN — NOREPINEPHRINE BITARTRATE 0.38 MCG/KG/MIN: 1 INJECTION, SOLUTION, CONCENTRATE INTRAVENOUS at 04:02

## 2019-01-01 RX ADMIN — NOREPINEPHRINE BITARTRATE 0.3 MCG/KG/MIN: 1 INJECTION, SOLUTION, CONCENTRATE INTRAVENOUS at 02:28

## 2019-01-01 RX ADMIN — CARVEDILOL 3.12 MG: 3.12 TABLET, FILM COATED ORAL at 17:42

## 2019-01-01 RX ADMIN — CALCIUM GLUCONATE 50 ML: 20 INJECTION, SOLUTION INTRAVENOUS at 22:06

## 2019-01-01 RX ADMIN — WARFARIN SODIUM 3 MG: 3 TABLET ORAL at 17:03

## 2019-01-01 RX ADMIN — FERROUS SULFATE TAB EC 324 MG (65 MG FE EQUIVALENT) 324 MG: 324 (65 FE) TABLET DELAYED RESPONSE at 10:01

## 2019-01-01 RX ADMIN — FUROSEMIDE 40 MG: 10 INJECTION, SOLUTION INTRAVENOUS at 12:37

## 2019-01-01 RX ADMIN — SODIUM BICARBONATE 650 MG TABLET 650 MG: at 15:48

## 2019-01-01 RX ADMIN — LORAZEPAM 1 MG: 2 INJECTION INTRAMUSCULAR; INTRAVENOUS at 01:36

## 2019-01-01 RX ADMIN — GLYCOPYRROLATE 0.1 MG: 0.2 INJECTION, SOLUTION INTRAMUSCULAR; INTRAVENOUS at 07:56

## 2019-01-01 RX ADMIN — Medication 10 ML: at 20:24

## 2019-01-01 RX ADMIN — SODIUM CHLORIDE 500 ML: 0.9 INJECTION, SOLUTION INTRAVENOUS at 12:35

## 2019-01-01 RX ADMIN — PROPOFOL 210 MG: 10 INJECTION, EMULSION INTRAVENOUS at 13:56

## 2019-01-01 RX ADMIN — LORAZEPAM 1 MG: 2 INJECTION INTRAMUSCULAR; INTRAVENOUS at 08:07

## 2019-01-01 RX ADMIN — NOREPINEPHRINE BITARTRATE 0.02 MCG/KG/MIN: 1 INJECTION, SOLUTION, CONCENTRATE INTRAVENOUS at 23:46

## 2019-01-01 RX ADMIN — NOREPINEPHRINE BITARTRATE 0.28 MCG/KG/MIN: 1 INJECTION, SOLUTION, CONCENTRATE INTRAVENOUS at 08:31

## 2019-01-01 RX ADMIN — CARVEDILOL 6.25 MG: 6.25 TABLET, FILM COATED ORAL at 10:04

## 2019-01-01 RX ADMIN — Medication 3 ML: at 08:44

## 2019-01-01 RX ADMIN — Medication 10 ML: at 08:32

## 2019-01-01 RX ADMIN — Medication 3 ML: at 07:50

## 2019-01-01 RX ADMIN — NOREPINEPHRINE BITARTRATE 0.32 MCG/KG/MIN: 1 INJECTION, SOLUTION, CONCENTRATE INTRAVENOUS at 19:52

## 2019-01-01 RX ADMIN — CARVEDILOL 3.12 MG: 3.12 TABLET, FILM COATED ORAL at 17:05

## 2019-01-01 RX ADMIN — INSULIN HUMAN 10 UNITS: 100 INJECTION, SOLUTION PARENTERAL at 22:06

## 2019-01-01 RX ADMIN — CALCIUM GLUCONATE 50 ML: 20 INJECTION, SOLUTION INTRAVENOUS at 08:51

## 2019-01-01 RX ADMIN — PANTOPRAZOLE SODIUM 40 MG: 40 INJECTION, POWDER, LYOPHILIZED, FOR SOLUTION INTRAVENOUS at 15:08

## 2019-01-01 RX ADMIN — BISACODYL 5 MG: 5 TABLET, COATED ORAL at 08:43

## 2019-01-01 RX ADMIN — POTASSIUM CHLORIDE 20 MEQ: 1500 TABLET, EXTENDED RELEASE ORAL at 07:41

## 2019-01-01 RX ADMIN — NOREPINEPHRINE BITARTRATE 0.36 MCG/KG/MIN: 1 INJECTION, SOLUTION, CONCENTRATE INTRAVENOUS at 18:32

## 2019-01-01 RX ADMIN — FERROUS SULFATE TAB EC 324 MG (65 MG FE EQUIVALENT) 324 MG: 324 (65 FE) TABLET DELAYED RESPONSE at 07:49

## 2019-01-01 RX ADMIN — Medication 10 ML: at 08:30

## 2019-01-01 RX ADMIN — LORAZEPAM 1 MG: 2 INJECTION INTRAMUSCULAR; INTRAVENOUS at 09:44

## 2019-01-01 RX ADMIN — LEVOTHYROXINE SODIUM 50 MCG: 50 TABLET ORAL at 06:03

## 2019-01-01 RX ADMIN — Medication 3 ML: at 15:08

## 2019-01-01 RX ADMIN — GLYCOPYRROLATE 0.1 MG: 0.2 INJECTION, SOLUTION INTRAMUSCULAR; INTRAVENOUS at 14:31

## 2019-01-01 RX ADMIN — MORPHINE SULFATE 2 MG: 4 INJECTION INTRAVENOUS at 05:46

## 2019-01-01 RX ADMIN — INSULIN HUMAN 5 UNITS: 100 INJECTION, SOLUTION PARENTERAL at 15:08

## 2019-01-01 RX ADMIN — Medication 10 ML: at 05:47

## 2019-01-01 RX ADMIN — SODIUM BICARBONATE 650 MG TABLET 650 MG: at 07:49

## 2019-01-01 RX ADMIN — DEXTROSE 50 % IN WATER (D50W) INTRAVENOUS SYRINGE 50 ML: at 06:06

## 2019-01-01 RX ADMIN — PHYTONADIONE 10 MG: 10 INJECTION, EMULSION INTRAMUSCULAR; INTRAVENOUS; SUBCUTANEOUS at 12:37

## 2019-01-01 RX ADMIN — CARVEDILOL 3.12 MG: 3.12 TABLET, FILM COATED ORAL at 08:43

## 2019-01-01 RX ADMIN — LEVOTHYROXINE SODIUM 50 MCG: 50 TABLET ORAL at 06:05

## 2019-01-01 RX ADMIN — LORAZEPAM 1 MG: 2 INJECTION INTRAMUSCULAR; INTRAVENOUS at 22:21

## 2019-01-01 RX ADMIN — LORAZEPAM 1 MG: 2 INJECTION INTRAMUSCULAR; INTRAVENOUS at 05:47

## 2019-01-01 RX ADMIN — Medication 10 ML: at 23:43

## 2019-01-01 RX ADMIN — NOREPINEPHRINE BITARTRATE 0.32 MCG/KG/MIN: 1 INJECTION, SOLUTION, CONCENTRATE INTRAVENOUS at 12:21

## 2019-01-01 RX ADMIN — MORPHINE SULFATE 2 MG: 4 INJECTION INTRAVENOUS at 13:53

## 2019-01-01 RX ADMIN — SODIUM BICARBONATE 650 MG TABLET 650 MG: at 17:03

## 2019-01-01 RX ADMIN — DOCUSATE SODIUM 100 MG: 100 CAPSULE, LIQUID FILLED ORAL at 12:29

## 2019-01-01 RX ADMIN — BUMETANIDE 3 MG: 0.25 INJECTION INTRAMUSCULAR; INTRAVENOUS at 13:30

## 2019-01-01 RX ADMIN — Medication 3 ML: at 10:04

## 2019-01-01 RX ADMIN — PHYTONADIONE 10 MG: 10 INJECTION, EMULSION INTRAMUSCULAR; INTRAVENOUS; SUBCUTANEOUS at 12:34

## 2019-01-01 RX ADMIN — Medication 10 ML: at 08:07

## 2019-01-01 RX ADMIN — LIDOCAINE HYDROCHLORIDE 50 MG: 10 INJECTION, SOLUTION EPIDURAL; INFILTRATION; INTRACAUDAL; PERINEURAL at 13:56

## 2019-01-01 RX ADMIN — POTASSIUM CHLORIDE 20 MEQ: 1500 TABLET, EXTENDED RELEASE ORAL at 08:43

## 2019-01-01 RX ADMIN — MORPHINE SULFATE 2 MG: 4 INJECTION INTRAVENOUS at 08:07

## 2019-01-01 RX ADMIN — FUROSEMIDE 40 MG: 10 INJECTION, SOLUTION INTRAVENOUS at 07:50

## 2019-01-01 RX ADMIN — Medication 3 ML: at 21:26

## 2019-01-01 RX ADMIN — Medication 3 ML: at 07:44

## 2019-01-01 RX ADMIN — Medication 10 ML: at 20:22

## 2019-01-01 RX ADMIN — SODIUM BICARBONATE 50 MEQ: 84 INJECTION, SOLUTION INTRAVENOUS at 13:30

## 2019-01-01 RX ADMIN — CARVEDILOL 3.12 MG: 3.12 TABLET, FILM COATED ORAL at 07:41

## 2019-01-01 RX ADMIN — Medication 10 ML: at 20:41

## 2019-01-01 RX ADMIN — DEXTROSE MONOHYDRATE 150 MEQ: 5 INJECTION, SOLUTION INTRAVENOUS at 02:24

## 2019-01-01 RX ADMIN — CARVEDILOL 3.12 MG: 3.12 TABLET, FILM COATED ORAL at 07:49

## 2019-01-01 RX ADMIN — MORPHINE SULFATE 2 MG: 4 INJECTION INTRAVENOUS at 16:15

## 2019-01-01 RX ADMIN — MORPHINE SULFATE 2 MG: 4 INJECTION INTRAVENOUS at 14:31

## 2019-01-01 RX ADMIN — ONDANSETRON 4 MG: 2 INJECTION INTRAMUSCULAR; INTRAVENOUS at 20:59

## 2019-01-01 RX ADMIN — PANTOPRAZOLE SODIUM 40 MG: 40 INJECTION, POWDER, FOR SOLUTION INTRAVENOUS at 04:31

## 2019-01-01 RX ADMIN — SODIUM CHLORIDE 500 ML: 0.9 INJECTION, SOLUTION INTRAVENOUS at 11:48

## 2019-01-01 RX ADMIN — SODIUM BICARBONATE 50 MEQ: 84 INJECTION, SOLUTION INTRAVENOUS at 15:08

## 2019-01-01 RX ADMIN — DEXTROSE 50 % IN WATER (D50W) INTRAVENOUS SYRINGE 50 ML: at 15:08

## 2019-01-01 RX ADMIN — LORAZEPAM 1 MG: 2 INJECTION INTRAMUSCULAR; INTRAVENOUS at 13:53

## 2019-01-01 RX ADMIN — DEXTROSE MONOHYDRATE 150 MEQ: 5 INJECTION, SOLUTION INTRAVENOUS at 16:48

## 2019-01-01 RX ADMIN — Medication 3 ML: at 21:09

## 2019-01-01 RX ADMIN — PANTOPRAZOLE SODIUM 40 MG: 40 INJECTION, POWDER, LYOPHILIZED, FOR SOLUTION INTRAVENOUS at 08:31

## 2019-01-01 RX ADMIN — Medication 10 ML: at 20:23

## 2019-01-01 RX ADMIN — POTASSIUM CHLORIDE 20 MEQ: 1500 TABLET, EXTENDED RELEASE ORAL at 10:02

## 2019-01-01 RX ADMIN — Medication 3 ML: at 20:12

## 2019-01-01 RX ADMIN — MORPHINE SULFATE 2 MG: 4 INJECTION INTRAVENOUS at 09:45

## 2019-01-01 RX ADMIN — Medication 10 ML: at 08:46

## 2019-01-01 RX ADMIN — IRON SUCROSE 400 MG: 20 INJECTION, SOLUTION INTRAVENOUS at 15:25

## 2019-01-01 RX ADMIN — SODIUM CHLORIDE: 0.9 INJECTION, SOLUTION INTRAVENOUS at 13:56

## 2019-01-01 RX ADMIN — CARVEDILOL 6.25 MG: 6.25 TABLET, FILM COATED ORAL at 21:25

## 2019-01-01 RX ADMIN — PANTOPRAZOLE SODIUM 40 MG: 40 TABLET, DELAYED RELEASE ORAL at 07:31

## 2019-01-01 RX ADMIN — Medication 10 ML: at 08:45

## 2019-01-01 RX ADMIN — FUROSEMIDE 40 MG: 10 INJECTION, SOLUTION INTRAVENOUS at 07:42

## 2019-01-01 RX ADMIN — SODIUM BICARBONATE 650 MG TABLET 650 MG: at 07:41

## 2019-01-01 RX ADMIN — PANTOPRAZOLE SODIUM 40 MG: 40 TABLET, DELAYED RELEASE ORAL at 07:49

## 2019-01-01 RX ADMIN — PANTOPRAZOLE SODIUM 40 MG: 40 INJECTION, POWDER, LYOPHILIZED, FOR SOLUTION INTRAVENOUS at 21:08

## 2019-01-01 RX ADMIN — PANTOPRAZOLE SODIUM 40 MG: 40 INJECTION, POWDER, FOR SOLUTION INTRAVENOUS at 05:55

## 2019-01-01 RX ADMIN — FUROSEMIDE 40 MG: 10 INJECTION, SOLUTION INTRAVENOUS at 08:43

## 2019-01-01 RX ADMIN — CALCIUM GLUCONATE 50 ML: 20 INJECTION, SOLUTION INTRAVENOUS at 15:07

## 2019-01-01 RX ADMIN — FERROUS SULFATE TAB EC 324 MG (65 MG FE EQUIVALENT) 324 MG: 324 (65 FE) TABLET DELAYED RESPONSE at 08:43

## 2019-01-01 RX ADMIN — PHYTONADIONE 2.5 MG: 10 INJECTION, EMULSION INTRAMUSCULAR; INTRAVENOUS; SUBCUTANEOUS at 10:31

## 2019-01-01 RX ADMIN — Medication 10 ML: at 23:42

## 2019-01-01 RX ADMIN — POTASSIUM CHLORIDE 20 MEQ: 1500 TABLET, EXTENDED RELEASE ORAL at 18:36

## 2019-07-05 PROBLEM — I50.9 ACUTE ON CHRONIC CONGESTIVE HEART FAILURE (HCC): Status: ACTIVE | Noted: 2019-01-01

## 2019-07-05 NOTE — PLAN OF CARE
Problem: Patient Care Overview  Goal: Plan of Care Review  Outcome: Ongoing (interventions implemented as appropriate)   07/05/19 7371   Coping/Psychosocial   Plan of Care Reviewed With patient   OTHER   Outcome Summary Patient complains of shortness of breath. Has BLE edema. No complaints voiced at this time.        Problem: Fall Risk (Adult)  Goal: Identify Related Risk Factors and Signs and Symptoms  Outcome: Outcome(s) achieved Date Met: 07/05/19    Goal: Absence of Fall  Outcome: Outcome(s) achieved Date Met: 07/05/19      Problem: Cardiac: Heart Failure (Adult)  Goal: Signs and Symptoms of Listed Potential Problems Will be Absent, Minimized or Managed (Cardiac: Heart Failure)  Outcome: Ongoing (interventions implemented as appropriate)      Problem: Skin Injury Risk (Adult)  Goal: Identify Related Risk Factors and Signs and Symptoms  Outcome: Outcome(s) achieved Date Met: 07/05/19    Goal: Skin Health and Integrity  Outcome: Ongoing (interventions implemented as appropriate)      Problem: Fluid Volume Excess (Adult)  Goal: Identify Related Risk Factors and Signs and Symptoms  Outcome: Outcome(s) achieved Date Met: 07/05/19    Goal: Optimal Fluid Balance  Outcome: Ongoing (interventions implemented as appropriate)

## 2019-07-05 NOTE — ED PROVIDER NOTES
Subjective   Chief complaint Short of breath    History of present illness 89-year-old female complains of a 3-day history of increasing shortness of breath and leg swelling.  No chest pain.  Worse with exertion better with rest moderate to severe.  No fever chills sweats cough congestion vomiting or diarrhea no recent long car rides plane ride immobilization or foreign travels no recent hospitalization.  No ill exposures.            Review of Systems   Constitutional: Negative for chills and fever.   HENT: Negative for congestion and sinus pain.    Eyes: Negative for photophobia and visual disturbance.   Respiratory: Positive for shortness of breath. Negative for chest tightness.    Cardiovascular: Positive for leg swelling. Negative for chest pain.   Gastrointestinal: Negative for abdominal pain and vomiting.   Endocrine: Negative for cold intolerance and heat intolerance.   Genitourinary: Negative for difficulty urinating and dysuria.   Musculoskeletal: Negative for arthralgias and back pain.   Skin: Negative for pallor and rash.   Neurological: Negative for dizziness and syncope.   Psychiatric/Behavioral: Negative for agitation and behavioral problems.       No past medical history on file.  Congestive heart failure valvular problem  No Known Allergies    No past surgical history on file.    No family history on file.    Social History     Socioeconomic History   • Marital status:      Spouse name: Not on file   • Number of children: Not on file   • Years of education: Not on file   • Highest education level: Not on file           Objective   Physical Exam  89-year-old female awake alert no acute distress sats 93% on room air.  HEENT extraocular muscles intact pupils equal and reactive sclera clear mouth clear neck supple mild JVD no bruits lungs rales in the bases no retractions heart irregular slight murmur no rub M was soft without tenderness no masses extremities 2+ edema no calf cords Homans sign or  evidence of DVT pulses are equal throughout the lower extremities.  Patient's awake alert follows commands no facial asymmetry normal speech no focal weakness.  Procedures        Prior to Admission medications    Medication Sig Start Date End Date Taking? Authorizing Provider   furosemide (LASIX) 40 MG tablet TAKE 1 TABLET BY MOUTH EVERY DAY 6/24/19   INDERJIT Esquivel MD   warfarin (COUMADIN) 2 MG tablet TAKE 2 TABLETS BY MOUTH EVERY DAY OR AS DIRECTED 7/5/19   INDERJIT Esquivel MD           ED Course      Results for orders placed or performed during the hospital encounter of 07/05/19   Basic Metabolic Panel   Result Value Ref Range    Glucose 109 (H) 65 - 99 mg/dL    BUN 40 (H) 8 - 20 mg/dL    Creatinine 2.20 (H) 0.40 - 1.00 mg/dL    Sodium 136 136 - 144 mmol/L    Potassium 4.5 3.6 - 5.1 mmol/L    Chloride 108 101 - 111 mmol/L    CO2 20.0 (L) 22.0 - 32.0 mmol/L    Calcium 8.3 (L) 8.9 - 10.3 mg/dL    eGFR Non African Amer 21 (L) >60 mL/min/1.73    BUN/Creatinine Ratio 18.2 5.4 - 26.2    Anion Gap 12.5 10.0 - 20.0 mmol/L   Protime-INR   Result Value Ref Range    Protime 55.0 (H) 19.4 - 28.5 Seconds    INR 5.54 (C) 2.00 - 3.00   Troponin   Result Value Ref Range    Troponin I 0.030 0.000 - 0.030 ng/mL   CBC Auto Differential   Result Value Ref Range    WBC 4.90 3.40 - 10.80 10*3/mm3    RBC 3.62 (L) 3.77 - 5.28 10*6/mm3    Hemoglobin 10.5 (L) 12.0 - 15.9 g/dL    Hematocrit 32.5 (L) 34.0 - 46.6 %    MCV 89.7 79.0 - 97.0 fL    MCH 28.9 26.6 - 33.0 pg    MCHC 32.2 31.5 - 35.7 g/dL    RDW 17.2 (H) 12.3 - 15.4 %    RDW-SD 54.3 (H) 37.0 - 54.0 fl    MPV 7.6 6.0 - 12.0 fL    Platelets 95 (L) 140 - 450 10*3/mm3    Neutrophil % 82.9 (H) 42.7 - 76.0 %    Lymphocyte % 9.5 (L) 19.6 - 45.3 %    Monocyte % 7.0 5.0 - 12.0 %    Eosinophil % 0.0 (L) 0.3 - 6.2 %    Basophil % 0.6 0.0 - 1.5 %    Neutrophils, Absolute 4.10 1.70 - 7.00 10*3/mm3    Lymphocytes, Absolute 0.50 (L) 0.70 - 3.10 10*3/mm3    Monocytes, Absolute 0.30 0.10 - 0.90  10*3/mm3    Eosinophils, Absolute 0.00 0.00 - 0.40 10*3/mm3    Basophils, Absolute 0.00 0.00 - 0.20 10*3/mm3    nRBC 0.0 0.0 - 0.2 /100 WBC     Xr Chest 1 View    Result Date: 7/5/2019   1. Increasing, predominantly interstitial, and somewhat groundglass opacities. Favor interstitial edema. Atypical infection, or chronic, interstitial pneumonitis opacities are less favored.  Electronically Signed By-Hesham Fisher On:7/5/2019 12:40 PM This report was finalized on [id number] by  Hesham Fisher, .    Medications   sodium chloride 0.9 % flush 10 mL (not administered)   furosemide (LASIX) injection 40 mg (40 mg Intravenous Given 7/5/19 1237)     EKG my interpretation atrial fibrillation flutter with ventricular paced complexes interventricular conduction delay rate of 68 no change from previous EKG abnormal bundle branch present              MDM  Number of Diagnoses or Management Options  Acute on chronic congestive heart failure, unspecified heart failure type (CMS/HCC):   Diagnosis management comments: Medical decision making.  Patient had IV established placed on a monitor placed on oxygen given Lasix 40 mg IV and had the above exam and evaluation.  Patient CBC unremarkable.  Patient's INR is 5.5.  Patient's creatinine is 2.2.  Which is increased from 1.5 about a month ago.  The patient's chest x-ray shows congestive heart failure.  Patient repeat exam was resting currently.  Sats mid 90s.  She was made aware of the findings.  Initial troponin is negative the creatinine is bumped up.  Hospitalist paged.  Patient's family will find to be placed in the hospital in observation for further care.  Stable unremarkable ER course        Final diagnoses:   Acute on chronic congestive heart failure, unspecified heart failure type (CMS/HCC)            Nicolas Irwin MD  07/05/19 9466

## 2019-07-05 NOTE — H&P
[facility] HOSPITALIST     Suma Watson MD    CHIEF COMPLAINT:     Shortness of breath on exertion    HISTORY OF PRESENT ILLNESS:    History of present illness 89-year-old female complains of a 3-day history of increasing shortness of breath and leg swelling.  No chest pain.  Worse with exertion better with rest moderate to severe.  No fever chills sweats cough congestion vomiting or diarrhea no recent long car rides plane ride immobilization or foreign travels no recent hospitalization.  No ill exposures.  Her past medical history includes hypertension hypothyroidism coronary artery disease paroxysmal atrial fibrillation sick sinus syndrome valvular disease past surgical history includes CABG with aortic valve replacement in 2001 she also has a dual-chamber pacemaker [employer] initially placed in 2003 with replacement in August 2010        Past Medical History:   Diagnosis Date   • CHF (congestive heart failure) (CMS/HCC)    Hypertension  Paroxysmal atrial fibrillation  Coronary artery disease history of CABG  Aortic valve replacement  Pacemaker dual-chamber  History reviewed. No pertinent surgical history.  History reviewed. No pertinent family history.  Social History     Tobacco Use   • Smoking status: Never Smoker   • Smokeless tobacco: Never Used   Substance Use Topics   • Alcohol use: No     Frequency: Never   • Drug use: No     Medications Prior to Admission   Medication Sig Dispense Refill Last Dose   • cholecalciferol (VITAMIN D3) 1000 units tablet Take 1,000 Units by mouth Daily.   7/4/2019 at Unknown time   • diltiaZEM CD (CARDIZEM CD) 240 MG 24 hr capsule Take 240 mg by mouth Daily.  3 7/5/2019 at Unknown time   • FERROCITE 324 MG tablet Take 325 mg by mouth Daily.  5 7/4/2019 at Unknown time   • furosemide (LASIX) 20 MG tablet Take 20 mg by mouth Every Evening.  5 7/4/2019 at Unknown time   • levothyroxine (SYNTHROID, LEVOTHROID) 50 MCG tablet Take 50 mcg by mouth Daily.  "  7/5/2019 at Unknown time   • pantoprazole (PROTONIX) 40 MG EC tablet Take 40 mg by mouth Daily.  5 7/5/2019 at Unknown time   • potassium chloride (K-DUR,KLOR-CON) 20 MEQ CR tablet Take 20 mEq by mouth Daily.   7/4/2019 at Unknown time   • warfarin (COUMADIN) 3 MG tablet Take 3 mg by mouth 4 (Four) Times a Week. Tuesdays, Thursdays, Saturdays, and Sundays   7/4/2019 at Unknown time   • warfarin (COUMADIN) 4 MG tablet Take 4 mg by mouth 3 (Three) Times a Week. Mondays, Wednesdays, and Fridays   Past Week at Unknown time     Allergies:  Patient has no known allergies.      There is no immunization history on file for this patient.        REVIEW OF SYSTEMS:  Please see the above history of present illness for pertinent positives and negatives.  The remainder of the patient's systems have been reviewed and are negative.     Vital Signs  Temp:  [97.6 °F (36.4 °C)-98.1 °F (36.7 °C)] 98.1 °F (36.7 °C)  Heart Rate:  [] 74  Resp:  [18-24] 18  BP: (102-156)/(59-76) 116/69    Flowsheet Rows      First Filed Value   Admission Height  152.4 cm (60\") Documented at 07/05/2019 1143   Admission Weight  51.7 kg (113 lb 15.7 oz) Documented at 07/05/2019 1143           Physical Exam:  Physical Exam   Constitutional: Patient appears well-developed and well-nourished and in no acute distress     HEENT:   Head: Normocephalic and atraumatic.   Eyes:  Pupils are equal, round, and reactive to light. EOM are intact. Sclera are anicteric and non-injected.  Mouth and Throat: Patient has moist mucous membranes.      Neck: Neck supple.  No thyromegaly present. No lymphadenopathy present. No  masses.     Cardiovascular: Inspection: No JVD present. Palpation:bilaterally. No leg edema. Auscultation: Regular rate, regular rhythm, S1 normal and S2 normal. reveals no gallop and no friction rub. No Carotid bruit bilaterally.    Pulmonary/Chest: Inspection: No distress, no use of accessory muscles. Lungs are clear to auscultation bilaterally. No " respiratory distress. No wheezes. No rhonchi. No rales.     Abdomen /Gastrointestinal: Inspection: no distension. Palpation: no masses, no organomegaly. Soft. There is no tenderness. Bowel sounds are normal.   Extremities no cyanosis clubbing or edema    Neurological: Patient is alert and oriented to person, place, and time. Cranial nerves II-XII are grossly intact with no focal deficits. Sensori-motor exam is normal. No cerebellar signs.    Skin: Skin is warm. No rash noted. Nails show no clubbing.  No cyanosis or erythema. No bruising.        Results Review:     Lab Results (most recent)     Procedure Component Value Units Date/Time    Protime-INR [[id number]]  (Abnormal) Collected:  07/05/19 1236    Specimen:  Blood from Arm, Left Updated:  07/05/19 1255     Protime 55.0 Seconds      INR 5.54    Basic Metabolic Panel [[id number]]  (Abnormal) Collected:  07/05/19 1206    Specimen:  Blood Updated:  07/05/19 1238     Glucose 109 mg/dL      BUN 40 mg/dL      Creatinine 2.20 mg/dL      Sodium 136 mmol/L      Potassium 4.5 mmol/L      Chloride 108 mmol/L      CO2 20.0 mmol/L      Calcium 8.3 mg/dL      eGFR Non African Amer 21 mL/min/1.73      BUN/Creatinine Ratio 18.2     Anion Gap 12.5 mmol/L     Narrative:       The MDRD GFR formula is only valid for adults with stable renal function between ages 18 and 70.    Troponin [[id number]]  (Normal) Collected:  07/05/19 1206    Specimen:  Blood Updated:  07/05/19 1237     Troponin I 0.030 ng/mL     Narrative:       Troponin I Reference Range:    0.00-0.03  Negative.  Repeat testing in 4-6 hours if clinically indicated.    0.04-0.29  Suspicious for myocardial injury. Serial measurements and clinical  correlation may be necessary to confirm or exclude diagnosis of acute  coronary syndrome.  Repeat testing in 4-6 hours if indicated.     >0.29 Consistent with myocardial injury.  Recommend clinical and laboratory correlation.     Results my be falsely decreased if patient taking  Biotin.     CBC & Differential [[id number]] Collected:  07/05/19 1206    Specimen:  Blood Updated:  07/05/19 1218    Narrative:       The following orders were created for panel order CBC & Differential.  Procedure                               Abnormality         Status                     ---------                               -----------         ------                     CBC Auto Differential[[id number]]        Abnormal            Final result                 Please view results for these tests on the individual orders.    CBC Auto Differential [[id number]]  (Abnormal) Collected:  07/05/19 1206    Specimen:  Blood Updated:  07/05/19 1218     WBC 4.90 10*3/mm3      RBC 3.62 10*6/mm3      Hemoglobin 10.5 g/dL      Hematocrit 32.5 %      MCV 89.7 fL      MCH 28.9 pg      MCHC 32.2 g/dL      RDW 17.2 %      RDW-SD 54.3 fl      MPV 7.6 fL      Platelets 95 10*3/mm3      Neutrophil % 82.9 %      Lymphocyte % 9.5 %      Monocyte % 7.0 %      Eosinophil % 0.0 %      Basophil % 0.6 %      Neutrophils, Absolute 4.10 10*3/mm3      Lymphocytes, Absolute 0.50 10*3/mm3      Monocytes, Absolute 0.30 10*3/mm3      Eosinophils, Absolute 0.00 10*3/mm3      Basophils, Absolute 0.00 10*3/mm3      nRBC 0.0 /100 WBC           Imaging Results (most recent)     Procedure Component Value Units Date/Time    XR Chest 1 View [[id number]] Collected:  07/05/19 1236     Updated:  07/05/19 1242    Narrative:       DATE OF EXAM:  7/5/2019 12:26 PM     PROCEDURE:  XR CHEST 1 VW-     INDICATIONS:  Short of breath     COMPARISON:  9/3/2018     TECHNIQUE:   Single radiographic AP view of the chest was obtained.     FINDINGS:  There are bilateral interstitial opacities which appear to have  increased compared to the prior exam. Patient is status post CABG with  cardiomegaly and dual-lead pacemaker, which is unchanged. Small  bilateral pleural effusions are suspected. No evidence of pneumothorax.  Regional skeleton is unremarkable.        Impression:           1. Increasing, predominantly interstitial, and somewhat groundglass  opacities. Favor interstitial edema. Atypical infection, or chronic,  interstitial pneumonitis opacities are less favored.     Electronically Signed By-Hesham Fisher On:7/5/2019 12:40 PM  This report was finalized on [id number] by  Hesham Fisher, .          ECG/EMG Results (most recent)     Procedure Component Value Units Date/Time    ECG 12 Lead [[id number]] Collected:  07/05/19 1156     Updated:  07/05/19 1802    Narrative:       HEART RATE= 68  bpm  RR Interval= 879  ms  AK Interval=   ms  P Horizontal Axis=   deg  P Front Axis=   deg  QRSD Interval= 131  ms  QT Interval= 442  ms  QRS Axis= -36  deg  T Wave Axis= 252  deg  - ABNORMAL ECG -  Afib/flut and V-paced complexes  IVCD, consider LBBB  Electronically Signed By: Nicolas Irwin (GINGER) 05-Jul-2019 18:02:13  Date and Time of Study: 2019-07-05 11:56:22          I reviewed the patient's new clinical results.    Assessment/Plan     1chf  Acute on chronic systolic CHF patient does have interstitial edema and is symptomatic she was given 1 dose of Lasix in the ER her situation is complicated by worsening of her creatinine from 1.5-2.2 1.5 was on June 4, 2019  And will put her on lower dose of Lasix IV 20 twice daily which may even be reduced to daily depending on her creatinine progression we will get renal ultrasound also will consult nephrology I am also going to do a repeat echo as the last echo was more than a year ago she sees Dr. Bird's as outpatient will get cardiology to take a look at her while she is in the hospital too she is on Cardizem CD for chronic atrial fibrillation certainly she will would not benefit from beta-blocker and Cardizem could be switched to oral beta-blocker as Cardizem is a negative inotrope though a week one I want to switch her to Coreg was started at 6.25 twice daily though I think she will likely need 12.5 twice daily we will DC the Cardizem no ACE  inhibitor or are will be used due to the renal insufficiency also spironolactone cannot be used for similar reasons she does not have any peripheral edema her symptoms should improve fairly quickly  Blood pressure is stable hemodynamically she is stable on room air  2 Coumadin toxicity INR 5.5 patient on Coumadin for chronic A. fib she does not have a mechanical valve she has a bioprosthetic valve and will simply hold off any further Coumadin until her INR drops to below 3 and then will resume will have pharmacy monitor monitor that evidence of any bleeding  3 hypothyroidism resume levothyroxine  4 acute on chronic renal insufficiency fluid cannot be given 4 because of the CHF check renal ultrasound check a [facility] nephrology consult  5 gastroesophageal reflux disease continue Protonix  6 this patient will be in the hospital for more than 2 midnights1  Interpretation5/2018  There is a bioprosthetic aortic valve.  Mild paravalvular aortic regurgitation  The gradient is normal for this prosthetic aortic valve.  Right ventricular systolic pressure is elevated at 50-60mmHg.  There is mild to moderate mitral regurgitation.  Right atrial size is normal.  There is a catheter/pacemaker lead seen in the right atrium.  There is a pacemaker lead in the right ventricle.  Left ventricular systolic function is mild to moderately reduced.  Ejection Fraction is 45-50%  There is septal akinesis.      Gianni Dorantes MD  07/05/19  6:28 PM

## 2019-07-06 NOTE — CONSULTS
NEPHROLOGY CONSULTATION-----[facility]    [facility]  [phone]  Diego Banks MD    Patient Care Team:  Suma Watson MD as PCP - General  INDERJIT Esquivel MD as PCP - Claims Attributed    CC/REASON FOR CONSULTATION: Elevated creatinine    History of Present Illness  89 year old white female with PMHx HTN, CKD, CAD s/p CABG, CHF, paroxysmal atrial fibrillation presented with increased swelling and SOA. She is admitted with CHF. Her cr was 2.2, trending up. She appears to have CKD with previous CR 1.5-1.8. UA previously negative. Renal US shows bilateral atrophic kidneys. No dysuria, gross hematuria. No NSAID use. Her INR was high at 5.5 on admission.    Review of Systems   As noted above, otherwise 10 systems reviewed and were negative.    Past Medical History:   Diagnosis Date   • CHF (congestive heart failure) (CMS/[facility])        History reviewed. No pertinent surgical history.    History reviewed. No pertinent family history.    Social History     Tobacco Use   • Smoking status: Never Smoker   • Smokeless tobacco: Never Used   Substance Use Topics   • Alcohol use: No     Frequency: Never   • Drug use: No       Home Meds:   Medications Prior to Admission   Medication Sig Dispense Refill Last Dose   • cholecalciferol (VITAMIN D3) 1000 units tablet Take 1,000 Units by mouth Daily.   7/4/2019 at Unknown time   • diltiaZEM CD (CARDIZEM CD) 240 MG 24 hr capsule Take 240 mg by mouth Daily.  3 7/5/2019 at Unknown time   • FERROCITE 324 MG tablet Take 325 mg by mouth Daily.  5 7/4/2019 at Unknown time   • furosemide (LASIX) 20 MG tablet Take 20 mg by mouth Every Evening.  5 7/4/2019 at Unknown time   • levothyroxine (SYNTHROID, LEVOTHROID) 50 MCG tablet Take 50 mcg by mouth Daily.   7/5/2019 at Unknown time   • pantoprazole (PROTONIX) 40 MG EC tablet Take 40 mg by mouth Daily.  5 7/5/2019 at Unknown time   • potassium chloride (K-DUR,KLOR-CON) 20 MEQ CR tablet Take  20 mEq by mouth Daily.   7/4/2019 at Unknown time   • warfarin (COUMADIN) 3 MG tablet Take 3 mg by mouth 4 (Four) Times a Week. Tuesdays, Thursdays, Saturdays, and Sundays   7/4/2019 at Unknown time   • warfarin (COUMADIN) 4 MG tablet Take 4 mg by mouth 3 (Three) Times a Week. Mondays, Wednesdays, and Fridays   Past Week at Unknown time       Scheduled Meds:    carvedilol 6.25 mg Oral Q12H   ferrous sulfate 324 mg Oral Daily With Breakfast   furosemide 20 mg Intravenous Daily   levothyroxine 50 mcg Oral Daily   pantoprazole 40 mg Oral QAM AC   potassium chloride 20 mEq Oral Daily   sodium chloride 3 mL Intravenous Q12H   vitamin K1 2.5 mg Oral Once       Continuous Infusions:    Pharmacy to dose warfarin    Pharmacy to dose warfarin        PRN Meds:  •  acetaminophen  •  acetaminophen  •  aluminum-magnesium hydroxide-simethicone  •  bisacodyl  •  calcium carbonate  •  docusate sodium  •  ipratropium-albuterol  •  Pharmacy to dose warfarin  •  Pharmacy to dose warfarin  •  promethazine  •  [COMPLETED] Insert peripheral IV **AND** sodium chloride  •  sodium chloride    Allergies:  Patient has no known allergies.      OBJECTIVE    Vital Signs  Temp:  [97.6 °F (36.4 °C)-98.5 °F (36.9 °C)] 98.4 °F (36.9 °C)  Heart Rate:  [] 64  Resp:  [18-24] 18  BP: ()/(58-76) 96/58    No intake/output data recorded.  I/O last 3 completed shifts:  In: -   Out: 300 [Urine:300]    Physical Exam:  General Appearance: alert, appears stated age and cooperative  Head: normocephalic, without obvious abnormality and atraumatic  Eyes: conjunctivae and sclerae normal and no icterus  Neck: supple and no JVD  Lungs: crackles  Heart: regular rhythm & normal rate and normal S1, S2  Chest Wall: no abnormalities observed  Abdomen: normal bowel sounds and soft non-tender  Extremities: moves extremities well, 2+ edema, no cyanosis and no redness  Skin: no bleeding, bruising or rash, turgor normal, color normal and no leasions  noted  Neurologic: Alert, and oriented. No focal deficits    Results Review:    I reviewed the patient's new clinical results.    WBC WBC   Date Value Ref Range Status   07/06/2019 3.40 3.40 - 10.80 10*3/mm3 Final   07/05/2019 4.90 3.40 - 10.80 10*3/mm3 Final      HGB Hemoglobin   Date Value Ref Range Status   07/06/2019 8.6 (L) 12.0 - 15.9 g/dL Final   07/05/2019 10.5 (L) 12.0 - 15.9 g/dL Final      HCT Hematocrit   Date Value Ref Range Status   07/06/2019 26.3 (L) 34.0 - 46.6 % Final   07/05/2019 32.5 (L) 34.0 - 46.6 % Final      Platlets No results found for: LABPLAT   MCV MCV   Date Value Ref Range Status   07/06/2019 89.7 79.0 - 97.0 fL Final   07/05/2019 89.7 79.0 - 97.0 fL Final          Sodium Sodium   Date Value Ref Range Status   07/06/2019 137 136 - 144 mmol/L Final   07/05/2019 136 136 - 144 mmol/L Final      Potassium Potassium   Date Value Ref Range Status   07/06/2019 4.6 3.6 - 5.1 mmol/L Final   07/05/2019 4.5 3.6 - 5.1 mmol/L Final      Chloride Chloride   Date Value Ref Range Status   07/06/2019 109 101 - 111 mmol/L Final   07/05/2019 108 101 - 111 mmol/L Final      CO2 CO2   Date Value Ref Range Status   07/06/2019 20.0 (L) 22.0 - 32.0 mmol/L Final   07/05/2019 20.0 (L) 22.0 - 32.0 mmol/L Final      BUN BUN   Date Value Ref Range Status   07/06/2019 41 (H) 8 - 20 mg/dL Final   07/05/2019 40 (H) 8 - 20 mg/dL Final      Creatinine Creatinine   Date Value Ref Range Status   07/06/2019 2.30 (H) 0.40 - 1.00 mg/dL Final   07/05/2019 2.20 (H) 0.40 - 1.00 mg/dL Final      Calcium Calcium   Date Value Ref Range Status   07/06/2019 7.8 (L) 8.9 - 10.3 mg/dL Final   07/05/2019 8.3 (L) 8.9 - 10.3 mg/dL Final      PO4 No results found for: CAPO4   Albumin Albumin   Date Value Ref Range Status   07/06/2019 2.30 (L) 3.50 - 4.80 g/dL Final      Magnesium Magnesium   Date Value Ref Range Status   07/06/2019 2.3 1.8 - 2.5 mg/dL Final      Uric Acid No results found for: URICACID       Imaging Results (last 72 hours)      Procedure Component Value Units Date/Time    US Renal Bilateral [[id number]] Collected:  07/05/19 2121     Updated:  07/05/19 2126    Narrative:       Examination: US RENAL BILATERAL-     Date of Exam: 7/5/2019 8:14 PM     Indication: Acute on chronic renal failure.     Comparison: None available.     Technique: Grayscale and color Doppler ultrasound evaluation of the  kidneys and urinary bladder was performed     Findings:  The right kidney measures 7.4 x 3.4 x 3.0 cm and the left kidney  measures 7.5 x 4.3 x 2.7 cm. Both kidneys are small in size with  cortical thickening indicating renal atrophy. There is no  hydronephrosis, echogenic shadowing stone, or renal mass. The patient  has incidental small bilateral basilar pleural effusions.The visualized  urinary bladder is unremarkable. The estimated urinary bladder volume is  20 cc.          Impression:          1. Bilateral renal atrophy.  2. Small bilateral basilar pleural effusions.  3. The visualized urinary bladder is unremarkable.     Electronically Signed By-Luciano Nick On:7/5/2019 9:24 PM  This report was finalized on [id number] by  Luciano Nick, .    XR Chest 1 View [[id number]] Collected:  07/05/19 1236     Updated:  07/05/19 1242    Narrative:       DATE OF EXAM:  7/5/2019 12:26 PM     PROCEDURE:  XR CHEST 1 VW-     INDICATIONS:  Short of breath     COMPARISON:  9/3/2018     TECHNIQUE:   Single radiographic AP view of the chest was obtained.     FINDINGS:  There are bilateral interstitial opacities which appear to have  increased compared to the prior exam. Patient is status post CABG with  cardiomegaly and dual-lead pacemaker, which is unchanged. Small  bilateral pleural effusions are suspected. No evidence of pneumothorax.  Regional skeleton is unremarkable.        Impression:          1. Increasing, predominantly interstitial, and somewhat groundglass  opacities. Favor interstitial edema. Atypical infection, or chronic,  interstitial pneumonitis  opacities are less favored.     Electronically Signed By-Hesham Fisher On:7/5/2019 12:40 PM  This report was finalized on [id number] by  Hesham Fisher, .            Results for orders placed during the hospital encounter of 07/05/19   XR Chest 1 View    Narrative DATE OF EXAM:  7/5/2019 12:26 PM     PROCEDURE:  XR CHEST 1 VW-     INDICATIONS:  Short of breath     COMPARISON:  9/3/2018     TECHNIQUE:   Single radiographic AP view of the chest was obtained.     FINDINGS:  There are bilateral interstitial opacities which appear to have  increased compared to the prior exam. Patient is status post CABG with  cardiomegaly and dual-lead pacemaker, which is unchanged. Small  bilateral pleural effusions are suspected. No evidence of pneumothorax.  Regional skeleton is unremarkable.        Impression    1. Increasing, predominantly interstitial, and somewhat groundglass  opacities. Favor interstitial edema. Atypical infection, or chronic,  interstitial pneumonitis opacities are less favored.     Electronically Signed By-Hesham Fisher On:7/5/2019 12:40 PM  This report was finalized on [id number] by  Hesham Fisher, .              ASSESSMENT / PLAN      Acute on chronic congestive heart failure (CMS/HCC)    · RANJITH--suspect cardiorenal in setting of fluid overload, CHF.   · CKD3--due to hypertensive nephrosclerosis, atrophic kidneys, previous UA neg  · HTN  · CHF  · Coumadin toxicity  · Anemia--Hb 10.5-->8.5. Monitor for acute loss, barbara with coumadin toxicity    Check UA, PVR  Continue diuretics  MNonitor H/H    I discussed the patients findings and my recommendations with patient and nursing staff    Will follow along closely. Thank you for allowing us to see this patient in renal consultation.    [facility]  [phone]  MD Diego Patricia MD  07/06/19  9:40 AM

## 2019-07-06 NOTE — PLAN OF CARE
Problem: Patient Care Overview  Goal: Plan of Care Review   07/06/19 0436   OTHER   Outcome Summary patient has no complaints. ambulating with minimal assistance.        Problem: Cardiac: Heart Failure (Adult)  Goal: Signs and Symptoms of Listed Potential Problems Will be Absent, Minimized or Managed (Cardiac: Heart Failure)  Outcome: Ongoing (interventions implemented as appropriate)   07/06/19 0436   Goal/Outcome Evaluation   Problems Assessed (Heart Failure) all;dysrhythmia/arrhythmia;functional decline/self-care deficit       Problem: Skin Injury Risk (Adult)  Intervention: Maintain Head of Bed Elevation Less Than 30 Degrees as Tolerated   07/06/19 0436   Positioning   Head of Bed (HOB) HOB at 20 degrees       Goal: Skin Health and Integrity  Outcome: Ongoing (interventions implemented as appropriate)   07/06/19 0436   Skin Injury Risk (Adult)   Skin Health and Integrity making progress toward outcome       Problem: Fluid Volume Excess (Adult)  Goal: Optimal Fluid Balance  Outcome: Ongoing (interventions implemented as appropriate)   07/06/19 0436   Fluid Volume Excess (Adult)   Optimal Fluid Balance making progress toward outcome

## 2019-07-06 NOTE — PLAN OF CARE
Problem: Patient Care Overview  Goal: Plan of Care Review  Outcome: Ongoing (interventions implemented as appropriate)    Goal: Individualization and Mutuality  Outcome: Ongoing (interventions implemented as appropriate)    Goal: Interprofessional Rounds/Family Conf  Outcome: Ongoing (interventions implemented as appropriate)      Problem: Cardiac: Heart Failure (Adult)  Goal: Signs and Symptoms of Listed Potential Problems Will be Absent, Minimized or Managed (Cardiac: Heart Failure)  Outcome: Ongoing (interventions implemented as appropriate)

## 2019-07-06 NOTE — CONSULTS
Cardiology Consult Note      Requesting Physician    Kaur Ramires MD    PATIENT IDENTIFICATION    Name: Esperanza Valencia Age: 89 y.o. Sex: female :  1929 MRN: [id number]    REASON FOR CONSULTATION:    89-year-old  female with known history of hypertensive heart disease, prior bioprosthetic AVR and chronic A. fib with permanent pacemaker.  Anticoagulated with warfarin.  Additional medical history includes hypertension, hypothyroidism, coronary artery disease, sick sinus syndrome    ELEUTERIO 2018: Mild perivalvular aortic insufficiency, normal gradients.  RVSP 50-60 mmHg with mild to moderate MR, septal akinesis, EF 45-50%    CC:  Shortness of breath    HISTORY OF PRESENT ILLNESS:     Patient presented to the emergency department with report of 3-day history of increasing shortness of breath and leg swelling.  She denies any chest pain.  Worse with exertion better with rest moderate to severe.  No fever chills sweats cough congestion vomiting or diarrhea no recent long car rides plane ride immobilization or foreign travels no recent hospitalization.  No ill exposures.    Pertinent labs include INR 5.4, creatinine 2.3.  Hemoglobin 8.6.  Chest x-ray with interstitial edema.  Upon my evaluation, patient reports that her breathing is much improved.  She denies any chest discomfort.          REVIEW OF SYSTEMS:  Pertinent items are noted in HPI, all other systems reviewed and negative    OBJECTIVE       ASSESSMENT/PLAN    Acute on chronic congestive heart failure (CMS/HCC)  Aortic valve insufficiency status post bioprosthetic valve replacement  Sick sinus syndrome  Permanent pacemaker in situ  Paroxysmal atrial fibrillation  Coronary artery disease  Anemia  Acute on chronic diastolic heart failure    Plan  Continue diuresis as renal function allows.  Patient currently on Lasix 40 mg IV twice daily  Continue beta-blocker  INR elevated on admission, patient given vitamin K for reversal.  Nephrology  "following  Patient having no symptoms of angina or anginal equivalent  Symptoms improving  Further recommendations as patient's hospital course progresses.            Vital Signs  Visit Vitals  BP 96/58   Pulse 64   Temp 98.4 °F (36.9 °C) (Oral)   Resp 18   Ht 152.4 cm (60\")   Wt 50.3 kg (110 lb 14.3 oz)   SpO2 92%   BMI 21.66 kg/m²     Oxygen Therapy  SpO2: 92 %  Pulse Oximetry Type: Intermittent  Device (Oxygen Therapy): room air  Flow (L/min): 2(per Dr. Dorantes)  ETCO2 (mmHg): 2 mmHg  Flowsheet Rows      First Filed Value   Admission Height  152.4 cm (60\") Documented at 07/05/2019 1143   Admission Weight  51.7 kg (113 lb 15.7 oz) Documented at 07/05/2019 1143        Intake & Output (last 3 days)       07/03 0701 - 07/04 0700 07/04 0701 - 07/05 0700 07/05 0701 - 07/06 0700 07/06 0701 - 07/07 0700    P.O.    520    Total Intake(mL/kg)    520 (10.4)    Urine (mL/kg/hr)   300     Total Output   300     Net   -300 +520                Lines, Drains & Airways    Active LDAs     Name:   Placement date:   Placement time:   Site:   Days:    Peripheral IV 07/05/19 1208 Right Antecubital   07/05/19    1208    Antecubital   1                Medical History    Past Medical History:   Diagnosis Date   • CHF (congestive heart failure) (CMS/[facility])         Surgical History    History reviewed. No pertinent surgical history.     Family History    History reviewed. No pertinent family history.    Social History    Social History     Tobacco Use   • Smoking status: Never Smoker   • Smokeless tobacco: Never Used   Substance Use Topics   • Alcohol use: No     Frequency: Never        Allergies    No Known Allergies           BP 96/58   Pulse 64   Temp 98.4 °F (36.9 °C) (Oral)   Resp 18   Ht 152.4 cm (60\")   Wt 50.3 kg (110 lb 14.3 oz)   SpO2 92%   BMI 21.66 kg/m²   Intake/Output last 3 shifts:  I/O last 3 completed shifts:  In: -   Out: 300 [Urine:300]  Intake/Output this shift:  I/O this shift:  In: 520 [P.O.:520]  Out: - "     PHYSICAL EXAM:    General Appearance: Well-developed, frail-appearing 89-year-old  female lying in bed in no acute distress. alert, cooperative, no distress, appears stated age  Head:  Normocephalic, without obvious abnormality, atraumatic  Eyes:  PERRL, conjunctiva/corneas clear, EOM's intact     Neck:  Supple,  no adenopathy, no bruit      Lungs:   Clear to auscultation bilaterally, respirations unlabored  Chest wall:  No tenderness  Heart:  Regular rate and rhythm, S1 and S2 normal, 2/6 systolic murmur  Abdomen:  Soft, non-tender, bowel sounds active all four quadrants,  no masses, no hepatomegaly, no splenomegaly  Extremities:  Extremities normal, no cyanosis or edema  Pulses: 2+ and symmetric all extremities  Skin:  No rashes or lesions  Neurologic:   Alert and oriented, no focal deficits.  Mild hearing loss    Scheduled Meds:        carvedilol 3.125 mg Oral BID With Meals   ferrous sulfate 324 mg Oral Daily With Breakfast   furosemide 40 mg Intravenous BID   levothyroxine 50 mcg Oral Daily   pantoprazole 40 mg Oral QAM AC   potassium chloride 20 mEq Oral Daily   sodium chloride 3 mL Intravenous Q12H       Continuous Infusions:      Pharmacy to dose warfarin    Pharmacy to dose warfarin        PRN Meds:    •  acetaminophen  •  acetaminophen  •  aluminum-magnesium hydroxide-simethicone  •  bisacodyl  •  calcium carbonate  •  docusate sodium  •  ipratropium-albuterol  •  Pharmacy to dose warfarin  •  Pharmacy to dose warfarin  •  promethazine  •  [COMPLETED] Insert peripheral IV **AND** sodium chloride  •  sodium chloride        Results Review:     I reviewed the patient's new clinical results.    CBC    Results from last 7 days   Lab Units 07/06/19  0212 07/05/19  1206   WBC 10*3/mm3 3.40 4.90   HEMOGLOBIN g/dL 8.6* 10.5*   PLATELETS 10*3/mm3 71* 95*     Cr Clearance Estimated Creatinine Clearance: 13.2 mL/min (A) (by C-G formula based on SCr of 2.3 mg/dL (H)).  Coag   Results from last 7 days   Lab  Units 07/06/19  0213 07/05/19  1236   INR  5.20* 5.54*     HbA1C No results found for: HGBA1C  Blood Glucose No results found for: POCGLU  Infection     CMP Results from last 7 days   Lab Units 07/06/19  0213 07/05/19  1206   SODIUM mmol/L 137 136   POTASSIUM mmol/L 4.6 4.5   CHLORIDE mmol/L 109 108   CO2 mmol/L 20.0* 20.0*   BUN mg/dL 41* 40*   CREATININE mg/dL 2.30* 2.20*   GLUCOSE mg/dL 105* 109*   ALBUMIN g/dL 2.30*  --    BILIRUBIN mg/dL 1.0  --    ALK PHOS U/L 60  --    AST (SGOT) U/L 28  --    ALT (SGPT) U/L 16  --      ABG      UA      ELANA  No results found for: POCMETH, POCAMPHET, POCBARBITUR, POCBENZO, POCCOCAINE, POCOPIATES, POCOXYCODO, POCPHENCYC, POCPROPOXY, POCTHC, POCTRICYC  Lysis Labs Results from last 7 days   Lab Units 07/06/19  0213 07/06/19  0212 07/05/19  1236 07/05/19  1206   INR  5.20*  --  5.54*  --    HEMOGLOBIN g/dL  --  8.6*  --  10.5*   PLATELETS 10*3/mm3  --  71*  --  95*   CREATININE mg/dL 2.30*  --   --  2.20*     Radiology(recent) Xr Chest 1 View    Result Date: 7/5/2019   1. Increasing, predominantly interstitial, and somewhat groundglass opacities. Favor interstitial edema. Atypical infection, or chronic, interstitial pneumonitis opacities are less favored.  Electronically Signed By-Hesham Fisher On:7/5/2019 12:40 PM This report was finalized on [id number] by  Hesham Fisher .    Us Renal Bilateral    Result Date: 7/5/2019   1. Bilateral renal atrophy. 2. Small bilateral basilar pleural effusions. 3. The visualized urinary bladder is unremarkable.  Electronically Signed By-Luciano Nick On:7/5/2019 9:24 PM This report was finalized on [id number] by  Luciano Nick, .        Results from last 7 days   Lab Units 07/05/19  1206   TROPONIN I ng/mL 0.030       Xrays, labs reviewed personally by physician.    ECG/EMG Results (most recent)     Procedure Component Value Units Date/Time    ECG 12 Lead [[id number]] Collected:  07/05/19 1156     Updated:  07/05/19 1802    Narrative:       HEART  RATE= 68  bpm  RR Interval= 879  ms  TX Interval=   ms  P Horizontal Axis=   deg  P Front Axis=   deg  QRSD Interval= 131  ms  QT Interval= 442  ms  QRS Axis= -36  deg  T Wave Axis= 252  deg  - ABNORMAL ECG -  Afib/flut and V-paced complexes  IVCD, consider LBBB  Electronically Signed By: Nicolas Irwin (GINGER) 05-Jul-2019 18:02:13  Date and Time of Study: 2019-07-05 11:56:22    Adult Transthoracic Echo Complete W/ Cont if Necessary Per Protocol [[id number]] Collected:  07/06/19 0824     Updated:  07/06/19 0907     BSA 1.4 m^2       CV ECHO CONCEPCIÓN - RVDD 3.1 cm      IVSd 1.0 cm      LVIDd 4.5 cm      LVIDs 3.1 cm      LVPWd 0.84 cm      IVS/LVPW 1.2     FS 30.4 %      EDV(Teich) 92.2 ml      ESV(Teich) 38.8 ml      EF(Teich) 57.9 %      EDV(cubed) 90.8 ml      ESV(cubed) 30.7 ml      EF(cubed) 66.2 %      LV mass(C)d 140.7 grams      LV mass(C)dI 97.2 grams/m^2      SV(Teich) 53.4 ml      SI(Teich) 36.9 ml/m^2      SV(cubed) 60.2 ml      SI(cubed) 41.6 ml/m^2      Ao root diam 3.2 cm      Ao root area 7.9 cm^2      ACS 1.5 cm      LVOT diam 1.9 cm      LVOT area 2.9 cm^2      RVOT diam 1.8 cm      RVOT area 2.5 cm^2      EDV(MOD-sp4) 73.8 ml      ESV(MOD-sp4) 32.6 ml      EF(MOD-sp4) 55.9 %      SV(MOD-sp4) 41.2 ml      SI(MOD-sp4) 28.5 ml/m^2      Ao root area (BSA corrected) 2.2     LV Gannon Vol (BSA corrected) 50.9 ml/m^2      LV Sys Vol (BSA corrected) 22.5 ml/m^2      MV E max prateek 89.6 cm/sec      MV V2 max 92.3 cm/sec      MV max PG 3.4 mmHg      MV V2 mean 46.7 cm/sec      MV mean PG 1.1 mmHg      MV V2 VTI 23.8 cm      MVA(VTI) 3.9 cm^2      MV dec time 0.15 sec      Ao pk prateek 268.8 cm/sec      Ao max PG 29.4 mmHg      Ao max PG (full) 21.7 mmHg      Ao V2 mean 194.8 cm/sec      Ao mean PG 17.0 mmHg      Ao mean PG (full) 12.3 mmHg      Ao V2 VTI 66.5 cm      LISSETTE(I,A) 1.4 cm^2      LISSETTE(I,D) 1.4 cm^2      LISSETTE(V,A) 1.5 cm^2      LISSETTE(V,D) 1.5 cm^2      AI max prateek 343.3 cm/sec      AI max PG 47.2 mmHg      AI dec  slope 158.2 cm/sec^2      AI dec time 2.2 sec      AI P1/2t 635.5 msec      LV V1 max PG 7.7 mmHg      LV V1 mean PG 4.7 mmHg      LV V1 max 138.5 cm/sec      LV V1 mean 103.1 cm/sec      LV V1 VTI 32.1 cm      MR max prateek 519.1 cm/sec      MR max .2 mmHg      SV(Ao) 526.7 ml      SI(Ao) 363.8 ml/m^2      SV(LVOT) 91.5 ml      SV(RVOT) 36.3 ml      SI(LVOT) 63.2 ml/m^2      PA V2 max 90.0 cm/sec      PA max PG 3.2 mmHg      PA max PG (full) 0.9 mmHg       CV ECHO CONCEPCIÓN - PVA(V,A) 2.1 cm^2       CV ECHO CONCEPCIÓN - PVA(V,D) 2.1 cm^2      PI end-d prateek 99.8 cm/sec      PI max prateek 208.5 cm/sec      PI max PG 17.4 mmHg      RV V1 max PG 2.3 mmHg      RV V1 mean PG 0.82 mmHg      RV V1 max 76.4 cm/sec      RV V1 mean 41.1 cm/sec      RV V1 VTI 14.8 cm      TR max prateek 250.6 cm/sec      RVSP(TR) 28.3 mmHg      RAP systole 3.0 mmHg      Qp/Qs 0.4      CV ECHO CONCEPCIÓN - BZI_BMI 21.5 kilograms/m^2       CV ECHO CONCEPCIÓN - BSA(HAYCOCK) 1.5 m^2       CV ECHO CONCEPCIÓN - BZI_METRIC_WEIGHT 49.9 kg       CV ECHO CONCEPCIÓN - BZI_METRIC_HEIGHT 152.4 cm      EF(MOD-bp) 56.0 %      LA dimension(2D) 4.6 cm             Medication Review:   I have reviewed the patient's current medication list  Scheduled Meds:  carvedilol 3.125 mg Oral BID With Meals   ferrous sulfate 324 mg Oral Daily With Breakfast   furosemide 40 mg Intravenous BID   levothyroxine 50 mcg Oral Daily   pantoprazole 40 mg Oral QAM AC   potassium chloride 20 mEq Oral Daily   sodium chloride 3 mL Intravenous Q12H     Continuous Infusions:  Pharmacy to dose warfarin    Pharmacy to dose warfarin      PRN Meds:.•  acetaminophen  •  acetaminophen  •  aluminum-magnesium hydroxide-simethicone  •  bisacodyl  •  calcium carbonate  •  docusate sodium  •  ipratropium-albuterol  •  Pharmacy to dose warfarin  •  Pharmacy to dose warfarin  •  promethazine  •  [COMPLETED] Insert peripheral IV **AND** sodium chloride  •  sodium chloride    Imaging:  Imaging Results (last 72 hours)     Procedure  Component Value Units Date/Time    US Renal Bilateral [[id number]] Collected:  07/05/19 2121     Updated:  07/05/19 2126    Narrative:       Examination: US RENAL BILATERAL-     Date of Exam: 7/5/2019 8:14 PM     Indication: Acute on chronic renal failure.     Comparison: None available.     Technique: Grayscale and color Doppler ultrasound evaluation of the  kidneys and urinary bladder was performed     Findings:  The right kidney measures 7.4 x 3.4 x 3.0 cm and the left kidney  measures 7.5 x 4.3 x 2.7 cm. Both kidneys are small in size with  cortical thickening indicating renal atrophy. There is no  hydronephrosis, echogenic shadowing stone, or renal mass. The patient  has incidental small bilateral basilar pleural effusions.The visualized  urinary bladder is unremarkable. The estimated urinary bladder volume is  20 cc.          Impression:          1. Bilateral renal atrophy.  2. Small bilateral basilar pleural effusions.  3. The visualized urinary bladder is unremarkable.     Electronically Signed By-Luciano Nick On:7/5/2019 9:24 PM  This report was finalized on [id number] by  Luciano Nick, .    XR Chest 1 View [[id number]] Collected:  07/05/19 1236     Updated:  07/05/19 1242    Narrative:       DATE OF EXAM:  7/5/2019 12:26 PM     PROCEDURE:  XR CHEST 1 VW-     INDICATIONS:  Short of breath     COMPARISON:  9/3/2018     TECHNIQUE:   Single radiographic AP view of the chest was obtained.     FINDINGS:  There are bilateral interstitial opacities which appear to have  increased compared to the prior exam. Patient is status post CABG with  cardiomegaly and dual-lead pacemaker, which is unchanged. Small  bilateral pleural effusions are suspected. No evidence of pneumothorax.  Regional skeleton is unremarkable.        Impression:          1. Increasing, predominantly interstitial, and somewhat groundglass  opacities. Favor interstitial edema. Atypical infection, or chronic,  interstitial pneumonitis opacities are  less favored.     Electronically Signed By-Hesham Fisher On:7/5/2019 12:40 PM  This report was finalized on [id number] by  Hesham Fisher .            SELINA Rush  07/06/19  12:57 PM

## 2019-07-06 NOTE — CONSULTS
"Adult Nutrition  Assessment/PES    Patient Name:  Esperanza Valencia  YOB: 1929  MRN: [id number]  Admit Date:  7/5/2019    Assessment Date:  7/6/2019    Comments:  Pt agreed to boost QD.    Reason for Assessment     Row Name 07/06/19 1320          Reason for Assessment    Reason For Assessment  nurse/nurse practitioner consult     Diagnosis  -- PMH: HTN, hypothyroidism, CAD, Paroxysmal AFib, SSS, CABG w Aortic Valve Replacement, Chronic Dx: Acute on Chronic systolic CHF, Hypothyroidism, Acute on Chronic Renal Insufficiency (cardiorenal), GERD     Identified At Risk by Screening Criteria  MST SCORE 2+         Nutrition/Diet History     Row Name 07/06/19 1322          Nutrition/Diet History    Typical Food/Fluid Intake  Meal intake doc x 1 meal 100%, Pt stated she does drink ONS at home & agreed to Boost QD. No weight loss noted pt confirmed UBW of 111 lbs.          Anthropometrics     Row Name 07/06/19 1326 07/06/19 0901       Anthropometrics    Height  152.4 cm (60\")     Weight  49.9 kg (110 lb)        Admit Weight    Admit Weight  51.7 kg (114 lb)  --       Ideal Body Weight (IBW)    Ideal Body Weight (IBW) (kg)  45.86     % Ideal Body Weight  108.8        Usual Body Weight (UBW)    Usual Body Weight  50.3 kg (111 lb)     % Usual Body Weight  99.1        Body Mass Index (BMI)    BMI (kg/m2)  21.53                                        Labs/Tests/Procedures/Meds     Row Name 07/06/19 1326          Labs/Procedures/Meds    Lab Results Comments  Glucose 105 (H), Crt 2.3 (H), BUN 41 (H), H/H 8.6/26.3 (L)        Medications    Pertinent Medications Comments  Lasix, Synthroid, Protonix, KDur         Physical Findings     Row Name 07/06/19 1327          Physical Findings    Gastrointestinal  -- No BM x 1 day     Skin  -- Skin intact                 Nutrition Prescription Ordered     Row Name 07/06/19 1328          Nutrition Prescription PO    Common Modifiers  Cardiac;Consistent Carbohydrate     "             Problem/Interventions:  Problem 1     Row Name 07/06/19 1328          Nutrition Diagnoses Problem 1    Problem 1  -- Inadequate oral intake     Etiology (related to)  -- hx of leaving > 25% of meals uneaten     Signs/Symptoms (evidenced by)  -- reported poor intake.                 Intervention Goal     Row Name 07/06/19 1329          Intervention Goal    General  Maintain nutrition     PO  Increase intake     Weight  Maintain weight         Nutrition Intervention     Row Name 07/06/19 1329          Nutrition Intervention    RD/Tech Action  -- Boost QD         Nutrition Prescription     Row Name 07/06/19 1329          Nutrition Prescription PO    Common Modifiers  Cardiac;Consistent Carbohydrate         Education/Evaluation     Row Name 07/06/19 1330          Monitor/Evaluation    Monitor  PO intake;Weight;Skin status           Electronically signed by:  Ashtyn Leggett RD  07/06/19 1:30 PM

## 2019-07-06 NOTE — PROGRESS NOTES
"        Subjective     Seen and examined, continues to improve.  No chest pain      Objective     Vital Signs  Visit Vitals  BP 96/58   Pulse 64   Temp 98.4 °F (36.9 °C) (Oral)   Resp 18   Ht 152.4 cm (60\")   Wt 50.3 kg (110 lb 14.3 oz)   SpO2 92%   BMI 21.66 kg/m²       Physical Exam:  Physical Exam   Constitutional: He is oriented to person, place, and time. No distress.   HENT:   Head: Normocephalic and atraumatic.   Eyes: Conjunctivae and EOM are normal. Pupils are equal, round, and reactive to light.   Neck: No JVD present. No thyromegaly present.   Cardiovascular: Normal rate, regular rhythm, normal heart sounds and intact distal pulses. Exam reveals no gallop and no friction rub.   No murmur heard.  Pulmonary/Chest: Effort normal and breath sounds normal. No stridor. No respiratory distress. He has no wheezes. He has no rales. He exhibits no tenderness.  Creased breath sounds at bilateral bases  Abdominal: Soft. Bowel sounds are normal. He exhibits no distension and no mass. There is no tenderness. There is no rebound and no guarding. No hernia.   Musculoskeletal: Normal range of motion.   Lymphadenopathy:     He has no cervical adenopathy.   Neurological: He is alert and oriented to person, place, and time. No cranial nerve deficit or sensory deficit. He exhibits normal muscle tone.   Skin: No rash noted. He is not diaphoretic.   Psychiatric: He has a normal mood and affect.   Vitals reviewed.    Physical Exam          Results Review:    CMP:  Lab Results   Component Value Date    BUN 41 (H) 07/06/2019    CREATININE 2.30 (H) 07/06/2019    EGFRIFNONA 20 (L) 07/06/2019     07/06/2019    K 4.6 07/06/2019     07/06/2019    CALCIUM 7.8 (L) 07/06/2019    ALBUMIN 2.30 (L) 07/06/2019    BILITOT 1.0 07/06/2019    ALKPHOS 60 07/06/2019    AST 28 07/06/2019    ALT 16 07/06/2019     CBC:  Lab Results   Component Value Date    WBC 3.40 07/06/2019    RBC 2.93 (L) 07/06/2019    HGB 8.6 (L) 07/06/2019    HCT " 26.3 (L) 07/06/2019    MCV 89.7 07/06/2019    MCH 29.4 07/06/2019    MCHC 32.7 07/06/2019    RDW 17.2 (H) 07/06/2019    PLT 71 (L) 07/06/2019         Medication Review:     Scheduled Meds:  carvedilol 3.125 mg Oral BID With Meals   ferrous sulfate 324 mg Oral Daily With Breakfast   furosemide 40 mg Intravenous BID   levothyroxine 50 mcg Oral Daily   pantoprazole 40 mg Oral QAM AC   potassium chloride 20 mEq Oral Daily   sodium chloride 3 mL Intravenous Q12H     Continuous Infusions:  Pharmacy to dose warfarin    Pharmacy to dose warfarin      PRN Meds:.•  acetaminophen  •  acetaminophen  •  aluminum-magnesium hydroxide-simethicone  •  bisacodyl  •  calcium carbonate  •  docusate sodium  •  ipratropium-albuterol  •  Pharmacy to dose warfarin  •  Pharmacy to dose warfarin  •  promethazine  •  [COMPLETED] Insert peripheral IV **AND** sodium chloride  •  sodium chloride    Assessment/Plan     Acute on chronic respiratory failure  Due to CHF, pleural effusions  Continue diuresis  Duo nebs as needed    Coumadin toxicity  Coumadin on hold, restart when INR under 3  We will go ahead with 1 dose of vitamin K today    A. fib  Anticoagulation as above  Continue with rate  Status post pacemaker    Acute on chronic kidney insufficiency  Due to CHF  Per nephrology    Status post aortic valve replacement  Bioprosthetic  Echo noted    Congestive heart failure  EF of 45-50  Acute on chronic [place] being diuresed as mentioned above  Continue home medications otherwise  Afterload reduction will need to be addressed as an outpatient per cardiology also depending on kidney function stabilization    Hypothyroidism  Continue Synthroid    GERD  Continue Protonix    Anemia   Hb stable    Plan as above    Kaur Ramires MD  07/06/19  11:42 AM

## 2019-07-07 NOTE — PROGRESS NOTES
"Cardiology [facility]      Patient Care Team:  Suma Watson MD as PCP - General  INDERJIT Esquivel MD as PCP - Claims Attributed        Chief Complaint: Shortness of breath    Subjective  Patient feels somewhat better  Shortness of breath is somewhat better    Interval History:   No significant change in the cardiac status in the last 24 hours    History taken from: patient    Review of Systems:  Review of Systems   Constitution: Negative for chills, decreased appetite and malaise/fatigue.   HENT: Negative for congestion.    Eyes: Negative for blurred vision and double vision.   Cardiovascular: Positive for chest pain, dyspnea on exertion and leg swelling. Negative for near-syncope and syncope.   Respiratory: Negative for cough and shortness of breath.    Hematologic/Lymphatic: Negative for adenopathy. Does not bruise/bleed easily.   Skin: Negative for rash.   Gastrointestinal: Negative for bloating and abdominal pain.   Neurological: Negative for dizziness and focal weakness.       Objective    Vital Signs  Visit Vitals  /70   Pulse 64   Temp 98 °F (36.7 °C) (Oral)   Resp 17   Ht 152.4 cm (60\")   Wt 49.9 kg (110 lb)   SpO2 94%   BMI 21.48 kg/m²     Oxygen Therapy  SpO2: 94 %  Pulse Oximetry Type: Intermittent  Device (Oxygen Therapy): room air  Flow (L/min): 0  ETCO2 (mmHg): 2 mmHg  Flowsheet Rows      First Filed Value   Admission Height  152.4 cm (60\") Documented at 07/05/2019 1143   Admission Weight  51.7 kg (113 lb 15.7 oz) Documented at 07/05/2019 1143        Intake & Output (last 3 days)       07/04 0701 - 07/05 0700 07/05 0701 - 07/06 0700 07/06 0701 - 07/07 0700 07/07 0701 - 07/08 0700    P.O.   520 520    Total Intake(mL/kg)   520 (10.4) 520 (10.4)    Urine (mL/kg/hr)  300 874 (0.7)     Total Output  300 874     Net  -300 -354 +520            Urine Unmeasured Occurrence   1 x         Lines, Drains & Airways    Active LDAs     Name:   Placement date:   Placement time:   Site:   Days:    " Peripheral IV 07/05/19 1208 Right Antecubital   07/05/19    1208    Antecubital   1                Physical Exam:  Physical Exam   Constitutional: She is oriented to person, place, and time. She appears well-developed and well-nourished.   HENT:   Head: Normocephalic and atraumatic.   Eyes: Conjunctivae are normal. Pupils are equal, round, and reactive to light.   Neck: Normal range of motion. Neck supple. No thyromegaly present.   Cardiovascular: Normal rate, regular rhythm, S1 normal, S2 normal and intact distal pulses. PMI is displaced.   1+ pitting edema bilaterally   Pulmonary/Chest: Effort normal and breath sounds normal.   Abdominal: Soft. Bowel sounds are normal.   Musculoskeletal: She exhibits no edema.   Neurological: She is alert and oriented to person, place, and time.   Skin: Skin is warm.       Results Review:     I reviewed the patient's new clinical results.    Lab Results (last 24 hours)     Procedure Component Value Units Date/Time    Protime-INR [[id number]]  (Abnormal) Collected:  07/07/19 0246    Specimen:  Blood Updated:  07/07/19 0432     Protime 40.4 Seconds      INR 4.14    Renal Function Panel [[id number]]  (Abnormal) Collected:  07/07/19 0246    Specimen:  Blood Updated:  07/07/19 0423     Glucose 102 mg/dL      BUN 45 mg/dL      Creatinine 2.10 mg/dL      Sodium 136 mmol/L      Potassium 4.3 mmol/L      Chloride 107 mmol/L      CO2 20.0 mmol/L      Calcium 8.0 mg/dL      Albumin 2.20 g/dL      Phosphorus 3.6 mg/dL      Anion Gap 13.3 mmol/L      BUN/Creatinine Ratio 21.4     eGFR Non African Amer 22 mL/min/1.73     Narrative:       The MDRD GFR formula is only valid for adults with stable renal function between ages 18 and 70.    CBC & Differential [[id number]] Collected:  07/07/19 0246    Specimen:  Blood Updated:  07/07/19 0405    Narrative:       The following orders were created for panel order CBC & Differential.  Procedure                               Abnormality         Status                      ---------                               -----------         ------                     CBC Auto Differential[[id number]]        Abnormal            Final result                 Please view results for these tests on the individual orders.    CBC Auto Differential [[id number]]  (Abnormal) Collected:  07/07/19 0246    Specimen:  Blood Updated:  07/07/19 0405     WBC 3.10 10*3/mm3      RBC 3.08 10*6/mm3      Hemoglobin 9.1 g/dL      Hematocrit 27.4 %      MCV 89.0 fL      MCH 29.5 pg      MCHC 33.1 g/dL      RDW 16.8 %      RDW-SD 52.5 fl      MPV 8.2 fL      Platelets 73 10*3/mm3      Neutrophil % 74.7 %      Lymphocyte % 14.4 %      Monocyte % 9.4 %      Eosinophil % 0.6 %      Basophil % 0.9 %      Neutrophils, Absolute 2.30 10*3/mm3      Lymphocytes, Absolute 0.40 10*3/mm3      Monocytes, Absolute 0.30 10*3/mm3      Eosinophils, Absolute 0.00 10*3/mm3      Basophils, Absolute 0.00 10*3/mm3      nRBC 0.3 /100 WBC     Sodium, Urine, Random - Urine, Clean Catch [[id number]] Collected:  07/06/19 1859    Specimen:  Urine, Clean Catch Updated:  07/06/19 2037     Sodium, Urine 98 mmol/L     Creatinine, Urine, Random - Urine, Clean Catch [[id number]] Collected:  07/06/19 1859    Specimen:  Urine, Clean Catch Updated:  07/06/19 2037     Creatinine, Urine 25.5 mg/dL     Urinalysis With Culture If Indicated - Urine, Clean Catch [[id number]]  (Abnormal) Collected:  07/06/19 1859    Specimen:  Urine, Clean Catch Updated:  07/06/19 2027     Color, UA Yellow     Appearance, UA Clear     pH, UA 5.5     Specific Gravity, UA 1.009     Glucose, UA Negative     Ketones, UA Negative     Bilirubin, UA Negative     Blood, UA Trace     Protein, UA Negative     Leuk Esterase, UA Negative     Nitrite, UA Negative     Urobilinogen, UA 0.2 E.U./dL    Urinalysis, Microscopic Only - Urine, Clean Catch [[id number]]  (Abnormal) Collected:  07/06/19 1859    Specimen:  Urine, Clean Catch Updated:  07/06/19 2027     RBC, UA 0-2 /HPF      WBC,  UA None Seen /HPF      Bacteria, UA None Seen /HPF      Squamous Epithelial Cells, UA 0-2 /HPF      Hyaline Casts, UA 0-2 /LPF      Methodology Automated Microscopy    Hemoglobin & Hematocrit, Blood [[id number]]  (Abnormal) Collected:  07/06/19 1356    Specimen:  Blood Updated:  07/06/19 1423     Hemoglobin 9.6 g/dL      Hematocrit 28.4 %             Medication Review:   I have reviewed the patient's current medication list  Scheduled Meds:  carvedilol 3.125 mg Oral BID With Meals   ferrous sulfate 324 mg Oral Daily With Breakfast   furosemide 40 mg Intravenous BID   levothyroxine 50 mcg Oral Daily   pantoprazole 40 mg Oral QAM AC   potassium chloride 20 mEq Oral Daily   sodium chloride 3 mL Intravenous Q12H     Continuous Infusions:  Pharmacy to dose warfarin    Pharmacy to dose warfarin      PRN Meds:.•  acetaminophen  •  acetaminophen  •  aluminum-magnesium hydroxide-simethicone  •  bisacodyl  •  calcium carbonate  •  docusate sodium  •  ipratropium-albuterol  •  Pharmacy to dose warfarin  •  Pharmacy to dose warfarin  •  promethazine  •  [COMPLETED] Insert peripheral IV **AND** sodium chloride  •  sodium chloride    ECG/EMG Results (last 24 hours)     Procedure Component Value Units Date/Time    Adult Transthoracic Echo Complete W/ Cont if Necessary Per Protocol [[id number]] Collected:  07/06/19 0824     Updated:  07/06/19 1607     BSA 1.4 m^2       CV ECHO CONCEPCIÓN - RVDD 3.1 cm      IVSd 1.0 cm      LVIDd 4.5 cm      LVIDs 3.1 cm      LVPWd 0.84 cm      IVS/LVPW 1.2     FS 30.4 %      EDV(Teich) 92.2 ml      ESV(Teich) 38.8 ml      EF(Teich) 57.9 %      EDV(cubed) 90.8 ml      ESV(cubed) 30.7 ml      EF(cubed) 66.2 %      LV mass(C)d 140.7 grams      LV mass(C)dI 97.2 grams/m^2      SV(Teich) 53.4 ml      SI(Teich) 36.9 ml/m^2      SV(cubed) 60.2 ml      SI(cubed) 41.6 ml/m^2      Ao root diam 3.2 cm      Ao root area 7.9 cm^2      ACS 1.5 cm      LVOT diam 1.9 cm      LVOT area 2.9 cm^2      RVOT diam 1.8 cm       RVOT area 2.5 cm^2      EDV(MOD-sp4) 73.8 ml      ESV(MOD-sp4) 32.6 ml      EF(MOD-sp4) 55.9 %      SV(MOD-sp4) 41.2 ml      SI(MOD-sp4) 28.5 ml/m^2      Ao root area (BSA corrected) 2.2     LV Gannon Vol (BSA corrected) 50.9 ml/m^2      LV Sys Vol (BSA corrected) 22.5 ml/m^2      MV E max prateek 89.6 cm/sec      MV V2 max 92.3 cm/sec      MV max PG 3.4 mmHg      MV V2 mean 46.7 cm/sec      MV mean PG 1.1 mmHg      MV V2 VTI 23.8 cm      MVA(VTI) 3.9 cm^2      MV dec time 0.15 sec      Ao pk prateek 268.8 cm/sec      Ao max PG 38.0 mmHg      Ao max PG (full) 21.7 mmHg      Ao V2 mean 194.8 cm/sec      Ao mean PG 21.7 mmHg      Ao mean PG (full) 12.3 mmHg      Ao V2 VTI 66.5 cm      LISSETTE(I,A) 1.4 cm^2      LISSETTE(I,D) 1.4 cm^2      LISSETTE(V,A) 1.5 cm^2      LISSETTE(V,D) 1.5 cm^2      AI max prateek 343.3 cm/sec      AI max PG 47.2 mmHg      AI dec slope 158.2 cm/sec^2      AI dec time 2.2 sec      AI P1/2t 635.5 msec      LV V1 max PG 7.7 mmHg      LV V1 mean PG 4.7 mmHg      LV V1 max 138.5 cm/sec      LV V1 mean 103.1 cm/sec      LV V1 VTI 32.1 cm      MR max prateek 519.1 cm/sec      MR max .2 mmHg      SV(Ao) 526.7 ml      SI(Ao) 363.8 ml/m^2      SV(LVOT) 91.5 ml      SV(RVOT) 36.3 ml      SI(LVOT) 63.2 ml/m^2      PA V2 max 90.0 cm/sec      PA max PG 3.2 mmHg      PA max PG (full) 0.9 mmHg      BH CV ECHO CONCEPCIÓN - PVA(V,A) 2.1 cm^2      BH CV ECHO CONCEPCIÓN - PVA(V,D) 2.1 cm^2      PI end-d prateek 99.8 cm/sec      PI max prateek 208.5 cm/sec      PI max PG 17.4 mmHg      RV V1 max PG 2.3 mmHg      RV V1 mean PG 0.82 mmHg      RV V1 max 76.4 cm/sec      RV V1 mean 41.1 cm/sec      RV V1 VTI 14.8 cm      TR max prateek 250.6 cm/sec      RVSP(TR) 28.3 mmHg      RAP systole 3.0 mmHg      Qp/Qs 0.4     BH CV ECHO CONCEPCIÓN - BZI_BMI 21.5 kilograms/m^2       CV ECHO CONCEPCIÓN - BSA([facility]) 1.5 m^2       CV ECHO CONCEPCIÓN - BZI_METRIC_WEIGHT 49.9 kg       CV ECHO CONCEPCIÓN - BZI_METRIC_HEIGHT 152.4 cm      EF(MOD-bp) 56.0 %      LA dimension(2D) 4.6 cm     Narrative:        · Left ventricular systolic function is normal.  · Estimated EF appears to be in the range of 56 - 60%  · There is a bioprosthetic aortic valve present  · The prosthetic aortic valve has the following abnormalities: stenosis  · Peak/mean aortic valve gradients are 38/22 mmHg respectively  · Mild-to-moderate mitral valve regurgitation is present  · Right ventricular cavity is severely dilated.  · Left atrial cavity size is severely dilated.             Imaging Results (last 24 hours)     ** No results found for the last 24 hours. **         · Left ventricular systolic function is normal.  · Estimated EF appears to be in the range of 56 - 60%  · There is a bioprosthetic aortic valve present  · The prosthetic aortic valve has the following abnormalities: stenosis  · Peak/mean aortic valve gradients are 38/22 mmHg respectively  · Mild-to-moderate mitral valve regurgitation is present  · Right ventricular cavity is severely dilated.  · Left atrial cavity size is severely dilated.          Assessment/Plan       Acute on chronic congestive heart failure (CMS/HCC)      Acute on chronic congestive heart failure (CMS/HCC)  Aortic valve insufficiency status post bioprosthetic valve replacement  Sick sinus syndrome  Permanent pacemaker in situ  Paroxysmal atrial fibrillation  Coronary artery disease  Anemia  Acute on chronic diastolic heart failure       Patient is a 89-year-old female with a prior history of hypertension valvular heart disease status post aortic valve replacement surgery with a bioprosthetic valve history of sick sinus syndrome status post pacemaker presents with symptoms of lower extremity edema and shortness of breath  Last echocardiogram in 2018 with mild perivalvular aortic insufficiency with pulmonary hypertension and mild cardiomyopathy  Repeat echocardiogram with normal LV systolic function /mild increase in the gradients across the aortic valve  Patient denies any symptoms of chest discomfort  Exam  and clinical findings consistent with volume overload  Hold Coumadin secondary to supratherapeutic INR  Continue close monitoring  Agree with diuresis and close monitoring of renal function  Further recommendations based on patient hospital course    Maria Victoria Whalen MD  07/07/19  8:48 AM

## 2019-07-07 NOTE — PROGRESS NOTES
Continued Stay Note  JULIETH Hartman     Patient Name: Esperanza Valencia  MRN: [id number]  Today's Date: 7/7/2019    Admit Date: 7/5/2019    Discharge Plan     Row Name 07/07/19 1031       Plan    Plan  DC PLAN: Pending PT recomendations- ordered on 7/7/19.     Patient/Family in Agreement with Plan  yes    Plan Comments  1026am: Spoke with pt's dgt, Brenda Parham [phone].  Pt lives alone in a house and Brenda lives across the street from her and stays with her at night and is over pt's home throughout the day.  The only DME patient uses at this time is a walker.  Pt does not drive and is able to afford her medications. PCP is Dr. Aruna Watson- she sees her about every 6 months.  Cardio: Dr. Esquivel- pt has labs done in his office monthly and sees Dr. sEquivel every 6 months.  Pt has had [facility] in the past and if HHC is needed they are the HHC agency pt and family wants to use- no HHC referral made at this time.  PT ordered on 7/7/19 & eval is pending. Will continue to follow and update as needed.         Discharge Codes    No documentation.             Emma Gardner RN

## 2019-07-07 NOTE — PROGRESS NOTES
"NEPHROLOGY PROGRESS NOTE    [facility]  [phone]  Diego Banks MD      Patient Care Team:  Suma Watson MD as PCP - General  INDERJIT Esquivel MD as PCP - Claims Attributed      Provider:  Diego Banks MD  Patient Name: Esperanza Valencia  :  1929    SUBJECTIVE:  F/u RANJITH/CKD  UA neg, diuresing well  Feels better    Medication:    carvedilol 3.125 mg Oral BID With Meals   ferrous sulfate 324 mg Oral Daily With Breakfast   [START ON 2019] furosemide 40 mg Intravenous Daily   levothyroxine 50 mcg Oral Daily   pantoprazole 40 mg Oral QAM AC   potassium chloride 20 mEq Oral Daily   sodium chloride 3 mL Intravenous Q12H       Pharmacy to dose warfarin    Pharmacy to dose warfarin        OBJECTIVE    Vital Sign Min/Max for last 24 hours  Temp  Min: 97.6 °F (36.4 °C)  Max: 98 °F (36.7 °C)   BP  Min: 91/56  Max: 125/70   Pulse  Min: 62  Max: 76   Resp  Min: 16  Max: 20   SpO2  Min: 93 %  Max: 95 %   No Data Recorded   Weight  Min: 49.9 kg (110 lb)  Max: 49.9 kg (110 lb)     Flowsheet Rows      First Filed Value   Admission Height  152.4 cm (60\") Documented at 2019 1143   Admission Weight  51.7 kg (113 lb 15.7 oz) Documented at 2019 1143          I/O this shift:  In: 520 [P.O.:520]  Out: 200 [Urine:200]  I/O last 3 completed shifts:  In: 520 [P.O.:520]  Out: 1174 [Urine:1174]    Physical Exam:  General Appearance: alert, appears stated age and cooperative  Head: normocephalic, without obvious abnormality and atraumatic  Eyes: conjunctivae and sclerae normal and no icterus  Neck: supple and no JVD  Lungs: clear to auscultation and respirations regular  Heart: regular rhythm & normal rate and normal S1, S2  Chest: Wall no abnormalities observed  Abdomen: normal bowel sounds and soft non-tender  Extremities: moves extremities well, 1+edema, no cyanosis and no redness  Skin: no bleeding, bruising or rash  Neurologic: Alert, and oriented. No focal " deficits    Labs:    WBC WBC   Date Value Ref Range Status   07/07/2019 3.10 (L) 3.40 - 10.80 10*3/mm3 Final   07/06/2019 3.40 3.40 - 10.80 10*3/mm3 Final   07/05/2019 4.90 3.40 - 10.80 10*3/mm3 Final      HGB Hemoglobin   Date Value Ref Range Status   07/07/2019 9.1 (L) 12.0 - 15.9 g/dL Final   07/06/2019 9.6 (L) 12.0 - 15.9 g/dL Final   07/06/2019 8.6 (L) 12.0 - 15.9 g/dL Final   07/05/2019 10.5 (L) 12.0 - 15.9 g/dL Final      HCT Hematocrit   Date Value Ref Range Status   07/07/2019 27.4 (L) 34.0 - 46.6 % Final   07/06/2019 28.4 (L) 34.0 - 46.6 % Final   07/06/2019 26.3 (L) 34.0 - 46.6 % Final   07/05/2019 32.5 (L) 34.0 - 46.6 % Final      Platlets No results found for: LABPLAT   MCV MCV   Date Value Ref Range Status   07/07/2019 89.0 79.0 - 97.0 fL Final   07/06/2019 89.7 79.0 - 97.0 fL Final   07/05/2019 89.7 79.0 - 97.0 fL Final          Sodium Sodium   Date Value Ref Range Status   07/07/2019 136 136 - 144 mmol/L Final   07/06/2019 137 136 - 144 mmol/L Final   07/05/2019 136 136 - 144 mmol/L Final      Potassium Potassium   Date Value Ref Range Status   07/07/2019 4.3 3.6 - 5.1 mmol/L Final   07/06/2019 4.6 3.6 - 5.1 mmol/L Final   07/05/2019 4.5 3.6 - 5.1 mmol/L Final      Chloride Chloride   Date Value Ref Range Status   07/07/2019 107 101 - 111 mmol/L Final   07/06/2019 109 101 - 111 mmol/L Final   07/05/2019 108 101 - 111 mmol/L Final      CO2 CO2   Date Value Ref Range Status   07/07/2019 20.0 (L) 22.0 - 32.0 mmol/L Final   07/06/2019 20.0 (L) 22.0 - 32.0 mmol/L Final   07/05/2019 20.0 (L) 22.0 - 32.0 mmol/L Final      BUN BUN   Date Value Ref Range Status   07/07/2019 45 (H) 8 - 20 mg/dL Final   07/06/2019 41 (H) 8 - 20 mg/dL Final   07/05/2019 40 (H) 8 - 20 mg/dL Final      Creatinine Creatinine   Date Value Ref Range Status   07/07/2019 2.10 (H) 0.40 - 1.00 mg/dL Final   07/06/2019 2.30 (H) 0.40 - 1.00 mg/dL Final   07/05/2019 2.20 (H) 0.40 - 1.00 mg/dL Final      Calcium Calcium   Date Value Ref  Range Status   07/07/2019 8.0 (L) 8.9 - 10.3 mg/dL Final   07/06/2019 7.8 (L) 8.9 - 10.3 mg/dL Final   07/05/2019 8.3 (L) 8.9 - 10.3 mg/dL Final      PO4 No components found for: PO4   Albumin Albumin   Date Value Ref Range Status   07/07/2019 2.20 (L) 3.50 - 4.80 g/dL Final   07/06/2019 2.30 (L) 3.50 - 4.80 g/dL Final      Magnesium Magnesium   Date Value Ref Range Status   07/06/2019 2.3 1.8 - 2.5 mg/dL Final      Uric Acid No components found for: URIC ACID     Imaging Results (last 72 hours)     Procedure Component Value Units Date/Time    US Renal Bilateral [[id number]] Collected:  07/05/19 2121     Updated:  07/05/19 2126    Narrative:       Examination: US RENAL BILATERAL-     Date of Exam: 7/5/2019 8:14 PM     Indication: Acute on chronic renal failure.     Comparison: None available.     Technique: Grayscale and color Doppler ultrasound evaluation of the  kidneys and urinary bladder was performed     Findings:  The right kidney measures 7.4 x 3.4 x 3.0 cm and the left kidney  measures 7.5 x 4.3 x 2.7 cm. Both kidneys are small in size with  cortical thickening indicating renal atrophy. There is no  hydronephrosis, echogenic shadowing stone, or renal mass. The patient  has incidental small bilateral basilar pleural effusions.The visualized  urinary bladder is unremarkable. The estimated urinary bladder volume is  20 cc.          Impression:          1. Bilateral renal atrophy.  2. Small bilateral basilar pleural effusions.  3. The visualized urinary bladder is unremarkable.     Electronically Signed By-Luciano Nick On:7/5/2019 9:24 PM  This report was finalized on [id number] by  Luciano Nick, .    XR Chest 1 View [[id number]] Collected:  07/05/19 1236     Updated:  07/05/19 1242    Narrative:       DATE OF EXAM:  7/5/2019 12:26 PM     PROCEDURE:  XR CHEST 1 VW-     INDICATIONS:  Short of breath     COMPARISON:  9/3/2018     TECHNIQUE:   Single radiographic AP view of the chest was obtained.      FINDINGS:  There are bilateral interstitial opacities which appear to have  increased compared to the prior exam. Patient is status post CABG with  cardiomegaly and dual-lead pacemaker, which is unchanged. Small  bilateral pleural effusions are suspected. No evidence of pneumothorax.  Regional skeleton is unremarkable.        Impression:          1. Increasing, predominantly interstitial, and somewhat groundglass  opacities. Favor interstitial edema. Atypical infection, or chronic,  interstitial pneumonitis opacities are less favored.     Electronically Signed By-Hesham Fisher On:7/5/2019 12:40 PM  This report was finalized on [id number] by  Hesham Fisher, .          Results for orders placed during the hospital encounter of 07/05/19   XR Chest 1 View    Narrative DATE OF EXAM:  7/5/2019 12:26 PM     PROCEDURE:  XR CHEST 1 VW-     INDICATIONS:  Short of breath     COMPARISON:  9/3/2018     TECHNIQUE:   Single radiographic AP view of the chest was obtained.     FINDINGS:  There are bilateral interstitial opacities which appear to have  increased compared to the prior exam. Patient is status post CABG with  cardiomegaly and dual-lead pacemaker, which is unchanged. Small  bilateral pleural effusions are suspected. No evidence of pneumothorax.  Regional skeleton is unremarkable.        Impression    1. Increasing, predominantly interstitial, and somewhat groundglass  opacities. Favor interstitial edema. Atypical infection, or chronic,  interstitial pneumonitis opacities are less favored.     Electronically Signed By-Hesham Fisher On:7/5/2019 12:40 PM  This report was finalized on [id number] by  Hesham Fisher, .            ASSESSMENT      Acute on chronic congestive heart failure (CMS/HCC)    · RANJITH--suspect cardiorenal in setting of fluid overload, CHF. 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

## 2019-07-07 NOTE — PROGRESS NOTES
"        Subjective     Much improved today, no shortness of breath on room air, peripheral edema is much improved      Objective     Vital Signs  Visit Vitals  /70   Pulse 64   Temp 98 °F (36.7 °C) (Oral)   Resp 17   Ht 152.4 cm (60\")   Wt 49.9 kg (110 lb)   SpO2 94%   BMI 21.48 kg/m²       Physical Exam:  Physical Exam   Constitutional: He is oriented to person, place, and time. No distress.   HENT:   Head: Normocephalic and atraumatic.   Eyes: Conjunctivae and EOM are normal. Pupils are equal, round, and reactive to light.   Neck: No JVD present. No thyromegaly present.   Cardiovascular: Normal rate, regular rhythm, normal heart sounds and intact distal pulses. Exam reveals no gallop and no friction rub.   No murmur heard.  Pulmonary/Chest: Effort normal and breath sounds normal. No stridor. No respiratory distress. He has no wheezes. He has no rales. He exhibits no tenderness.   Abdominal: Soft. Bowel sounds are normal. He exhibits no distension and no mass. There is no tenderness. There is no rebound and no guarding. No hernia.   Musculoskeletal: Normal range of motion.  Minimal 1+ edema bilateral lower extremities  Lymphadenopathy:     He has no cervical adenopathy.   Neurological: He is alert and oriented to person, place, and time. No cranial nerve deficit or sensory deficit. He exhibits normal muscle tone.   Skin: No rash noted. He is not diaphoretic.   Psychiatric: He has a normal mood and affect.   Vitals reviewed.    Physical Exam          Results Review:    CMP:  Lab Results   Component Value Date    BUN 45 (H) 07/07/2019    CREATININE 2.10 (H) 07/07/2019    EGFRIFNONA 22 (L) 07/07/2019     07/07/2019    K 4.3 07/07/2019     07/07/2019    CALCIUM 8.0 (L) 07/07/2019    ALBUMIN 2.20 (L) 07/07/2019    BILITOT 1.0 07/06/2019    ALKPHOS 60 07/06/2019    AST 28 07/06/2019    ALT 16 07/06/2019     CBC:  Lab Results   Component Value Date    WBC 3.10 (L) 07/07/2019    RBC 3.08 (L) 07/07/2019    " HGB 9.1 (L) 07/07/2019    HCT 27.4 (L) 07/07/2019    MCV 89.0 07/07/2019    MCH 29.5 07/07/2019    MCHC 33.1 07/07/2019    RDW 16.8 (H) 07/07/2019    PLT 73 (L) 07/07/2019         Medication Review:     Scheduled Meds:    carvedilol 3.125 mg Oral BID With Meals   ferrous sulfate 324 mg Oral Daily With Breakfast   [START ON 7/8/2019] furosemide 40 mg Intravenous Daily   levothyroxine 50 mcg Oral Daily   pantoprazole 40 mg Oral QAM AC   potassium chloride 20 mEq Oral Daily   sodium chloride 3 mL Intravenous Q12H     Continuous Infusions:    Pharmacy to dose warfarin    Pharmacy to dose warfarin      PRN Meds:.•  acetaminophen  •  acetaminophen  •  aluminum-magnesium hydroxide-simethicone  •  bisacodyl  •  calcium carbonate  •  docusate sodium  •  ipratropium-albuterol  •  Pharmacy to dose warfarin  •  Pharmacy to dose warfarin  •  promethazine  •  [COMPLETED] Insert peripheral IV **AND** sodium chloride  •  sodium chloride    Assessment/Plan     Acute on chronic respiratory failure  Much improved  Due to CHF, pleural effusions  Continue diuresis if okay with nephrology of the go ahead and de-escalate IV Lasix to once a day   Duo nebs as needed    Coumadin toxicity  INR improved status post dose of vitamin K, continue to hold Coumadin  Repeat INR in the morning    A. fib  Anticoagulation as above  Continue with rate  Status post pacemaker    Acute on chronic kidney insufficiency  Due to CHF  Per nephrology    Status post aortic valve replacement  Bioprosthetic  Echo noted    Congestive heart failure  EF of 45-50  Acute on chronic [place] being diuresed as mentioned above  Continue home medications otherwise  Per cardiology    Hypothyroidism  Continue Synthroid    GERD  Continue Protonix    Anemia   Hb stable    Plan as above    Kaur Ramires MD  07/07/19  10:44 AM

## 2019-07-08 NOTE — THERAPY EVALUATION
Acute Care - Physical Therapy Initial Evaluation   [facility]     Patient Name: Esperanza Valencia  : 1929  MRN: [id number]  Today's Date: 2019                Admit Date: 2019    Visit Dx:     ICD-10-CM ICD-9-CM   1. Acute on chronic congestive heart failure, unspecified heart failure type (CMS/HCC) I50.9 428.0     Patient Active Problem List   Diagnosis   • Acute on chronic congestive heart failure (CMS/HCC)     Past Medical History:   Diagnosis Date   • CHF (congestive heart failure) (CMS/HCC)      History reviewed. No pertinent surgical history.     PT ASSESSMENT (last 12 hours)      Physical Therapy Evaluation     Row Name 19 1500          PT Evaluation Time/Intention    Subjective Information  complains of;weakness  -CM     Document Type  evaluation  -CM     Mode of Treatment  individual therapy;physical therapy  -     Row Name 19 1500          General Information    Patient Observations  -- thin, frail in appearance  -CM     Patient/Family Observations  -- daughter notes that pt seems unsteady at home sometimes  -CM     General Observations of Patient  supine; room air; bed alarm on  -CM     Prior Level of Function  independent:;all household mobility;gait dtr assists w/ meals; uses rw for longer distances.   -CM     Equipment Currently Used at Home  walker, rolling  -CM     Pertinent History of Current Functional Problem  88 yo female adm on 19 for ae chf w/ BNP of 472.  -CM     Existing Precautions/Restrictions  fall  -     Row Name 19 1500          Relationship/Environment    Lives With  alone  -CM     Family Caregiver if Needed  child(nabil), adult daughter lives across street  -     Row Name 19 1500          Home Main Entrance    Number of Stairs, Main Entrance  two  -CM     Row Name 19 1500          Cognitive Assessment/Intervention- PT/OT    Orientation Status (Cognition)  oriented to;place;time thought Satya was president  -CM     Follows Commands  (Cognition)  WFL  -CM     Row Name 07/08/19 1500          Safety Issues, Functional Mobility    Safety Issues Affecting Function (Mobility)  awareness of need for assistance;insight into deficits/self awareness  -CM     Impairments Affecting Function (Mobility)  balance  -CM     Row Name 07/08/19 1500          Bed Mobility Assessment/Treatment    Bed Mobility Assessment/Treatment  bed mobility (all) activities  -CM     [place] Level (Bed Mobility)  independent  -CM     Row Name 07/08/19 1500          Transfer Assessment/Treatment    Comment (Transfers)  sba for all transfers  -     Row Name 07/08/19 1500          Gait/Stairs Assessment/Training    Gait/Stairs Assessment/Training  gait/ambulation independence  -CM     [place] Level (Gait)  contact guard  -CM     Assistive Device (Gait)  -- pt refused to use rw today  -CM     Distance in Feet (Gait)  50  -CM     Pattern (Gait)  step-through  -CM     Deviations/Abnormal Patterns (Gait)  stride length decreased;gait speed decreased intermittent lateral sway  -CM     Row Name 07/08/19 1500          General ROM    GENERAL ROM COMMENTS  wfl  -CM     Row Name 07/08/19 1500          MMT (Manual Muscle Testing)    General MMT Comments  4-/5 UEs; 3+/5 LEs  -CM     Row Name 07/08/19 1500          Pain Assessment    Additional Documentation  Pain Scale: Numbers Pre/Post-Treatment (Group)  -CM     Row Name 07/08/19 1500          Pain Scale: Numbers Pre/Post-Treatment    Pain Scale: Numbers, Pretreatment  0/10 - no pain  -CM     Pain Scale: Numbers, Post-Treatment  0/10 - no pain  -CM     Row Name 07/08/19 1500          Coping    Observed Emotional State  accepting;calm;cooperative  -CM     Row Name 07/08/19 1500          Physical Therapy Clinical Impression    Criteria for Skilled Interventions Met (PT Clinical Impression)  yes  -CM     Impairments Found (describe specific impairments)  aerobic capacity/endurance;gait, locomotion, and balance;muscle performance  -CM      Rehab Potential (PT Clinical Summary)  good, to achieve stated therapy goals  -CM     Row Name 07/08/19 1500          Vital Signs    Pre SpO2 (%)  91  -CM     O2 Delivery Pre Treatment  room air  -CM     Intra SpO2 (%)  89  -CM     O2 Delivery Intra Treatment  room air  -CM     Post SpO2 (%)  93  -CM     O2 Delivery Post Treatment  room air  -CM     Row Name 07/08/19 0701          Physical Therapy Goals    Transfer Goal Selection (PT)  transfer, PT goal 1  -CM     Gait Training Goal Selection (PT)  gait training, PT goal 1  -CM     Row Name 07/08/19 0701          Transfer Goal 1 (PT)    Activity/Assistive Device (Transfer Goal 1, PT)  transfers, all  -CM     [place] Level/Cues Needed (Transfer Goal 1, PT)  independent without loss of balance  -CM     Time Frame (Transfer Goal 1, PT)  2 weeks  -CM     Row Name 07/08/19 0701          Gait Training Goal 1 (PT)    Activity/Assistive Device (Gait Training Goal 1, PT)  gait (walking locomotion);assistive device use;walker, rolling  -CM     [place] Level (Gait Training Goal 1, PT)  conditional independence  -CM     Distance (Gait Goal 1, PT)  300  -CM     Time Frame (Gait Training Goal 1, PT)  2 weeks  -CM     Row Name 07/08/19 1500          Living Environment    Home Accessibility  stairs to enter home  -CM       User Key  (r) = Recorded By, (t) = Taken By, (c) = Cosigned By    Initials Name Provider Type    Yanelis Parker, PT Physical Therapist          PT Recommendation and Plan  Anticipated Discharge Disposition (PT): home with home health  Planned Therapy Interventions (PT Eval): balance training, gait training, strengthening, transfer training  Therapy Frequency (PT Clinical Impression): 3 times/wk  Outcome Summary/Treatment Plan (PT)  Anticipated Equipment Needs at Discharge (PT): (has rw at home; needs to use at d/c. )  Anticipated Discharge Disposition (PT): home with home health  Outcome Summary: 88 yo female adm for ae chf. Presents w/ mild  weakness & mild balance deficits. Pt is safe for home w/ home health if she will use her rw at home. Will follow 3xwk while in hospital.      Time Calculation:   PT Charges     Row Name 07/08/19 1541             Time Calculation    Start Time  1506  -CM      Stop Time  1519  -CM      Time Calculation (min)  13 min  -CM      PT Received On  07/08/19  -CM      PT - Next Appointment  07/10/19  -CM      PT Goal Re-Cert Due Date  07/22/19  -CM         Time Calculation- PT    Total Timed Code Minutes- PT  8 minute(s)  -CM        User Key  (r) = Recorded By, (t) = Taken By, (c) = Cosigned By    Initials Name Provider Type    CM Yanelis Fernandez, PT Physical Therapist        Therapy Charges for Today     Code Description Service Date Service Provider Modifiers Qty    20527344447 HC GAIT TRAINING EA 15 MIN 7/8/2019 Yanelis Fernandez, PT GP 1    83834798188 HC PT EVAL LOW COMPLEXITY 3 7/8/2019 Yanelis Fernandez, PT GP 1                 Yanelis Fernandez PT  7/8/2019

## 2019-07-08 NOTE — PLAN OF CARE
Problem: Patient Care Overview  Goal: Individualization and Mutuality  Outcome: Ongoing (interventions implemented as appropriate)      Problem: Fall Risk (Adult)  Goal: Absence of Fall  Outcome: Ongoing (interventions implemented as appropriate)      Problem: Cardiac: Heart Failure (Adult)  Goal: Signs and Symptoms of Listed Potential Problems Will be Absent, Minimized or Managed (Cardiac: Heart Failure)  Outcome: Ongoing (interventions implemented as appropriate)      Problem: Skin Injury Risk (Adult)  Goal: Skin Health and Integrity  Outcome: Ongoing (interventions implemented as appropriate)      Problem: Fluid Volume Excess (Adult)  Goal: Optimal Fluid Balance  Outcome: Ongoing (interventions implemented as appropriate)

## 2019-07-08 NOTE — PLAN OF CARE
Problem: Patient Care Overview  Goal: Plan of Care Review   07/08/19 1540   OTHER   Outcome Summary 88 yo female adm for ae chf. Presents w/ mild weakness & mild balance deficits. Pt is safe for home w/ home health if she will use her rw at home. Will follow 3xwk while in hospital.

## 2019-07-08 NOTE — PROGRESS NOTES
Discharge Planning Assessment   [facility]     Patient Name: Esperanza Valencia  MRN: [id number]  Today's Date: 7/8/2019    Admit Date: 7/5/2019    Discharge Needs Assessment     Row Name 07/08/19 1622       Living Environment    Lives With  alone    Unique Family Situation  Daughter lives across street from pt.    Current Living Arrangements  home/apartment/condo    Primary Care Provided by  self;child(nabil)    Caregiving Concerns  Lives alone.     Family Caregiver if Needed  child(nabil), adult    Family Caregiver Names  Brenda Parham cell [phone] home [phone]    Quality of Family Relationships  supportive    Able to Return to Prior Arrangements  yes    Living Arrangement Comments  PT recommends home health.       Resource/Environmental Concerns    Resource/Environmental Concerns  none       Transition Planning    Patient/Family Anticipates Transition to  home with help/services       Discharge Needs Assessment    Readmission Within the Last 30 Days  no previous admission in last 30 days    Equipment Currently Used at Home  walker, standard    Equipment Needed After Discharge  none    Outpatient/Agency/Support Group Needs  homecare agency    Discharge Facility/Level of Care Needs  home with home health    Offered/Gave Vendor List  yes    Patient's Choice of Community Agency(s)  [facility]    Discharge Coordination/Progress  Home with [facility].    Row Name 07/08/19 1603       Discharge Needs Assessment    Discharge Coordination/Progress  PT recommends home health.        Discharge Plan     Row Name 07/08/19 1627       Plan    Plan  Home with [facility].              Home Medical Care      Service Provider Request Status Selected Services Address Phone Number Fax Number    [facility] Pending - Request Sent N/A [address] [place] [address] [phone]      Therapy      No service coordination in this encounter.      Community Resources      No service coordination in  this encounter.        Expected Discharge Date and Time     Expected Discharge Date Expected Discharge Time    Jul 7, 2019                      Anjana Capellan RN

## 2019-07-08 NOTE — PLAN OF CARE
Problem: Patient Care Overview  Goal: Plan of Care Review  Outcome: Ongoing (interventions implemented as appropriate)   07/08/19 1600   Coping/Psychosocial   Plan of Care Reviewed With patient   OTHER   Outcome Summary Patient is doing well today, has no complaints or concerns at this time. Swelling has greatly decreased from when she came in. INR was 2.26 on last check at 1300 today. Will continue to monitor INR and patient is a possible discharge tomorrow.    Plan of Care Review   Progress improving     Goal: Discharge Needs Assessment  Outcome: Ongoing (interventions implemented as appropriate)    Goal: Interprofessional Rounds/Family Conf  Outcome: Ongoing (interventions implemented as appropriate)      Problem: Fall Risk (Adult)  Goal: Absence of Fall  Outcome: Ongoing (interventions implemented as appropriate)      Problem: Cardiac: Heart Failure (Adult)  Goal: Signs and Symptoms of Listed Potential Problems Will be Absent, Minimized or Managed (Cardiac: Heart Failure)  Outcome: Ongoing (interventions implemented as appropriate)      Problem: Skin Injury Risk (Adult)  Goal: Skin Health and Integrity  Outcome: Ongoing (interventions implemented as appropriate)      Problem: Fluid Volume Excess (Adult)  Goal: Optimal Fluid Balance  Outcome: Ongoing (interventions implemented as appropriate)

## 2019-07-08 NOTE — PROGRESS NOTES
"        Subjective     Continues to improve, INR better renal function stabilizing      Objective     Vital Signs  Visit Vitals  /64 (BP Location: Left arm, Patient Position: Lying)   Pulse 68   Temp 99.5 °F (37.5 °C) (Oral)   Resp 14   Ht 152.4 cm (60\")   Wt 49.9 kg (110 lb)   SpO2 92%   BMI 21.48 kg/m²       Physical Exam:  Physical Exam   Constitutional: He is oriented to person, place, and time. No distress.   HENT:   Head: Normocephalic and atraumatic.   Eyes: Conjunctivae and EOM are normal. Pupils are equal, round, and reactive to light.   Neck: No JVD present. No thyromegaly present.   Cardiovascular: Normal rate, regular rhythm, normal heart sounds and intact distal pulses. Exam reveals no gallop and no friction rub.   No murmur heard.  Pulmonary/Chest: Effort normal and breath sounds normal. No stridor. No respiratory distress. He has no wheezes. He has no rales. He exhibits no tenderness.   Abdominal: Soft. Bowel sounds are normal. He exhibits no distension and no mass. There is no tenderness. There is no rebound and no guarding. No hernia.   Musculoskeletal: Normal range of motion.  Minimal 1+ edema bilateral lower extremities  Lymphadenopathy:     He has no cervical adenopathy.   Neurological: He is alert and oriented to person, place, and time. No cranial nerve deficit or sensory deficit. He exhibits normal muscle tone.   Skin: No rash noted. He is not diaphoretic.   Psychiatric: He has a normal mood and affect.   Vitals reviewed.    Physical Exam          Results Review:    CMP:  Lab Results   Component Value Date    BUN 44 (H) 07/08/2019    CREATININE 2.10 (H) 07/08/2019    EGFRIFNONA 22 (L) 07/08/2019     07/08/2019    K 4.3 07/08/2019     07/08/2019    CALCIUM 8.1 (L) 07/08/2019    ALBUMIN 2.30 (L) 07/08/2019    BILITOT 1.0 07/06/2019    ALKPHOS 60 07/06/2019    AST 28 07/06/2019    ALT 16 07/06/2019     CBC:  Lab Results   Component Value Date    WBC 4.10 07/08/2019    RBC 3.33 " (L) 07/08/2019    HGB 9.8 (L) 07/08/2019    HCT 29.9 (L) 07/08/2019    MCV 89.8 07/08/2019    MCH 29.4 07/08/2019    MCHC 32.8 07/08/2019    RDW 16.6 (H) 07/08/2019    PLT 75 (L) 07/08/2019         Medication Review:     Scheduled Meds:    carvedilol 3.125 mg Oral BID With Meals   ferrous sulfate 324 mg Oral Daily With Breakfast   furosemide 40 mg Intravenous Daily   levothyroxine 50 mcg Oral Daily   pantoprazole 40 mg Oral QAM AC   potassium chloride 20 mEq Oral Daily   sodium bicarbonate 650 mg Oral TID   sodium chloride 3 mL Intravenous Q12H     Continuous Infusions:    Pharmacy to dose warfarin    Pharmacy to dose warfarin    Pharmacy to dose warfarin      PRN Meds:.•  acetaminophen  •  acetaminophen  •  aluminum-magnesium hydroxide-simethicone  •  bisacodyl  •  calcium carbonate  •  docusate sodium  •  ipratropium-albuterol  •  Pharmacy to dose warfarin  •  Pharmacy to dose warfarin  •  Pharmacy to dose warfarin  •  promethazine  •  [COMPLETED] Insert peripheral IV **AND** sodium chloride  •  sodium chloride    Assessment/Plan     Acute on chronic respiratory failure  Seems to improve  Due to CHF, pleural effusions  Decrease Lasix to once a day IV  Duo nebs as needed    Coumadin toxicity  Resolved status post dose of vitamin K    A. fib  Start Coumadin today pharmacy dosing  Continue with rate control  Status post pacemaker    Acute on chronic kidney insufficiency  Creatinine stabilizing  Due to CHF  Per nephrology    Status post aortic valve replacement  Bioprosthetic  Echo noted    Congestive heart failure  EF of 45-50  Acute on chronic [place] being diuresed as mentioned above  Continue home medications otherwise  Per cardiology    Hypothyroidism  Continue Synthroid    GERD  Continue Protonix    Anemia   Hb stable    Plan as above PT OT evaluation and discharge planning likely in the morning as long as INR therapeutic and in range    Kaur Ramires MD  07/08/19  11:57 AM

## 2019-07-08 NOTE — PROGRESS NOTES
"NEPHROLOGY PROGRESS NOTE    [facility]  [phone]  Katya Forrest MD      Patient Care Team:  Suma Watson MD as PCP - General  INDERJIT Esquivel MD as PCP - Claims Attributed      Provider:  Katya Forrest MD  Patient Name: Esperanza Valencia  :  1929    SUBJECTIVE:  No SOB, CP, dysuria. Feeling better    Medication:    carvedilol 3.125 mg Oral BID With Meals   ferrous sulfate 324 mg Oral Daily With Breakfast   furosemide 40 mg Intravenous Daily   levothyroxine 50 mcg Oral Daily   pantoprazole 40 mg Oral QAM AC   potassium chloride 20 mEq Oral Daily   sodium bicarbonate 650 mg Oral TID   sodium chloride 3 mL Intravenous Q12H       Pharmacy to dose warfarin    Pharmacy to dose warfarin        OBJECTIVE    Vital Sign Min/Max for last 24 hours  Temp  Min: 97.5 °F (36.4 °C)  Max: 99.5 °F (37.5 °C)   BP  Min: 101/68  Max: 125/73   Pulse  Min: 63  Max: 78   Resp  Min: 14  Max: 18   SpO2  Min: 91 %  Max: 96 %   No Data Recorded   No Data Recorded     Flowsheet Rows      First Filed Value   Admission Height  152.4 cm (60\") Documented at 2019 1143   Admission Weight  51.7 kg (113 lb 15.7 oz) Documented at 2019 1143          No intake/output data recorded.  I/O last 3 completed shifts:  In: 760 [P.O.:760]  Out: 875 [Urine:875]    Physical Exam:  General Appearance: alert, appears stated age and cooperative  Head: normocephalic, without obvious abnormality and atraumatic  Eyes: conjunctivae and sclerae normal and no icterus  Neck: supple and no JVD  Lungs: clear to auscultation and respirations regular NO CRACKLES  Heart: regular rhythm & normal rate and normal S1, S2 +NABIL  Chest: Wall no abnormalities observed  Abdomen: normal bowel sounds and soft non-tender  Extremities: moves extremities well, NO SIGNIFICANT EDEMA NOW, no cyanosis and no redness  Skin: no bleeding, bruising or rash  Neurologic: Alert, and oriented. No focal deficits    Labs:    WBC WBC "   Date Value Ref Range Status   07/08/2019 4.10 3.40 - 10.80 10*3/mm3 Final   07/07/2019 3.10 (L) 3.40 - 10.80 10*3/mm3 Final   07/06/2019 3.40 3.40 - 10.80 10*3/mm3 Final   07/05/2019 4.90 3.40 - 10.80 10*3/mm3 Final      HGB Hemoglobin   Date Value Ref Range Status   07/08/2019 9.8 (L) 12.0 - 15.9 g/dL Final   07/07/2019 9.1 (L) 12.0 - 15.9 g/dL Final   07/06/2019 9.6 (L) 12.0 - 15.9 g/dL Final   07/06/2019 8.6 (L) 12.0 - 15.9 g/dL Final   07/05/2019 10.5 (L) 12.0 - 15.9 g/dL Final      HCT Hematocrit   Date Value Ref Range Status   07/08/2019 29.9 (L) 34.0 - 46.6 % Final   07/07/2019 27.4 (L) 34.0 - 46.6 % Final   07/06/2019 28.4 (L) 34.0 - 46.6 % Final   07/06/2019 26.3 (L) 34.0 - 46.6 % Final   07/05/2019 32.5 (L) 34.0 - 46.6 % Final      Platlets No results found for: LABPLAT   MCV MCV   Date Value Ref Range Status   07/08/2019 89.8 79.0 - 97.0 fL Final   07/07/2019 89.0 79.0 - 97.0 fL Final   07/06/2019 89.7 79.0 - 97.0 fL Final   07/05/2019 89.7 79.0 - 97.0 fL Final          Sodium Sodium   Date Value Ref Range Status   07/08/2019 137 136 - 144 mmol/L Final   07/07/2019 136 136 - 144 mmol/L Final   07/06/2019 137 136 - 144 mmol/L Final   07/05/2019 136 136 - 144 mmol/L Final      Potassium Potassium   Date Value Ref Range Status   07/08/2019 4.3 3.6 - 5.1 mmol/L Final   07/07/2019 4.3 3.6 - 5.1 mmol/L Final   07/06/2019 4.6 3.6 - 5.1 mmol/L Final   07/05/2019 4.5 3.6 - 5.1 mmol/L Final      Chloride Chloride   Date Value Ref Range Status   07/08/2019 106 101 - 111 mmol/L Final   07/07/2019 107 101 - 111 mmol/L Final   07/06/2019 109 101 - 111 mmol/L Final   07/05/2019 108 101 - 111 mmol/L Final      CO2 CO2   Date Value Ref Range Status   07/08/2019 22.0 22.0 - 32.0 mmol/L Final   07/07/2019 20.0 (L) 22.0 - 32.0 mmol/L Final   07/06/2019 20.0 (L) 22.0 - 32.0 mmol/L Final   07/05/2019 20.0 (L) 22.0 - 32.0 mmol/L Final      BUN BUN   Date Value Ref Range Status   07/08/2019 44 (H) 8 - 20 mg/dL Final    07/07/2019 45 (H) 8 - 20 mg/dL Final   07/06/2019 41 (H) 8 - 20 mg/dL Final   07/05/2019 40 (H) 8 - 20 mg/dL Final      Creatinine Creatinine   Date Value Ref Range Status   07/08/2019 2.10 (H) 0.40 - 1.00 mg/dL Final   07/07/2019 2.10 (H) 0.40 - 1.00 mg/dL Final   07/06/2019 2.30 (H) 0.40 - 1.00 mg/dL Final   07/05/2019 2.20 (H) 0.40 - 1.00 mg/dL Final      Calcium Calcium   Date Value Ref Range Status   07/08/2019 8.1 (L) 8.9 - 10.3 mg/dL Final   07/07/2019 8.0 (L) 8.9 - 10.3 mg/dL Final   07/06/2019 7.8 (L) 8.9 - 10.3 mg/dL Final   07/05/2019 8.3 (L) 8.9 - 10.3 mg/dL Final      PO4 No components found for: PO4   Albumin Albumin   Date Value Ref Range Status   07/08/2019 2.30 (L) 3.50 - 4.80 g/dL Final   07/07/2019 2.20 (L) 3.50 - 4.80 g/dL Final   07/06/2019 2.30 (L) 3.50 - 4.80 g/dL Final      Magnesium Magnesium   Date Value Ref Range Status   07/08/2019 2.2 1.8 - 2.5 mg/dL Final   07/06/2019 2.3 1.8 - 2.5 mg/dL Final      Uric Acid No components found for: URIC ACID     Imaging Results (last 72 hours)     Procedure Component Value Units Date/Time    US Renal Bilateral [[id number]] Collected:  07/05/19 2121     Updated:  07/05/19 2126    Narrative:       Examination: US RENAL BILATERAL-     Date of Exam: 7/5/2019 8:14 PM     Indication: Acute on chronic renal failure.     Comparison: None available.     Technique: Grayscale and color Doppler ultrasound evaluation of the  kidneys and urinary bladder was performed     Findings:  The right kidney measures 7.4 x 3.4 x 3.0 cm and the left kidney  measures 7.5 x 4.3 x 2.7 cm. Both kidneys are small in size with  cortical thickening indicating renal atrophy. There is no  hydronephrosis, echogenic shadowing stone, or renal mass. The patient  has incidental small bilateral basilar pleural effusions.The visualized  urinary bladder is unremarkable. The estimated urinary bladder volume is  20 cc.          Impression:          1. Bilateral renal atrophy.  2. Small  bilateral basilar pleural effusions.  3. The visualized urinary bladder is unremarkable.     Electronically Signed By-Luciano Nick On:7/5/2019 9:24 PM  This report was finalized on [id number] by  Luciano Nick, .    XR Chest 1 View [[id number]] Collected:  07/05/19 1236     Updated:  07/05/19 1242    Narrative:       DATE OF EXAM:  7/5/2019 12:26 PM     PROCEDURE:  XR CHEST 1 VW-     INDICATIONS:  Short of breath     COMPARISON:  9/3/2018     TECHNIQUE:   Single radiographic AP view of the chest was obtained.     FINDINGS:  There are bilateral interstitial opacities which appear to have  increased compared to the prior exam. Patient is status post CABG with  cardiomegaly and dual-lead pacemaker, which is unchanged. Small  bilateral pleural effusions are suspected. No evidence of pneumothorax.  Regional skeleton is unremarkable.        Impression:          1. Increasing, predominantly interstitial, and somewhat groundglass  opacities. Favor interstitial edema. Atypical infection, or chronic,  interstitial pneumonitis opacities are less favored.     Electronically Signed By-Hesham Fisher On:7/5/2019 12:40 PM  This report was finalized on [id number] by  Hesham Fisher, .          Results for orders placed during the hospital encounter of 07/05/19   XR Chest 1 View    Narrative DATE OF EXAM:  7/5/2019 12:26 PM     PROCEDURE:  XR CHEST 1 VW-     INDICATIONS:  Short of breath     COMPARISON:  9/3/2018     TECHNIQUE:   Single radiographic AP view of the chest was obtained.     FINDINGS:  There are bilateral interstitial opacities which appear to have  increased compared to the prior exam. Patient is status post CABG with  cardiomegaly and dual-lead pacemaker, which is unchanged. Small  bilateral pleural effusions are suspected. No evidence of pneumothorax.  Regional skeleton is unremarkable.        Impression    1. Increasing, predominantly interstitial, and somewhat groundglass  opacities. Favor interstitial edema.  Atypical infection, or chronic,  interstitial pneumonitis opacities are less favored.     Electronically Signed By-Hesham Fisher On:7/5/2019 12:40 PM  This report was finalized on [id number] by  Hesham Fisher, .            ASSESSMENT      Acute on chronic congestive heart failure (CMS/HCC)      PLAN    1. ARF/RANJITH/CRF/CKD STG 3------Nonoliguric. BUN/Cr stable. In the long run, we may have to accept a higher baseline serum creatinine in order to keep euvolemic. No NSAIDs. No IV Dye.  Lytes, acid-base okay. No uremia    2. HTN WITH CKD STG 3-----BP okay    3. CHF/VOLUME OVERLOAD------Clinically better.     4. S/P COUMDAIN TOXICITY    5. ANEMIA OF CKD    6. ACIDOSIS    Katya Forrest MD  [facility]  [phone]  07/08/19  8:52 AM

## 2019-07-09 NOTE — PLAN OF CARE
Problem: Patient Care Overview  Goal: Individualization and Mutuality  Outcome: Ongoing (interventions implemented as appropriate)    Goal: Discharge Needs Assessment  Outcome: Ongoing (interventions implemented as appropriate)    Goal: Interprofessional Rounds/Family Conf  Outcome: Ongoing (interventions implemented as appropriate)      Problem: Fall Risk (Adult)  Goal: Absence of Fall  Outcome: Ongoing (interventions implemented as appropriate)      Problem: Cardiac: Heart Failure (Adult)  Goal: Signs and Symptoms of Listed Potential Problems Will be Absent, Minimized or Managed (Cardiac: Heart Failure)  Outcome: Ongoing (interventions implemented as appropriate)      Problem: Skin Injury Risk (Adult)  Goal: Skin Health and Integrity  Outcome: Ongoing (interventions implemented as appropriate)      Problem: Fluid Volume Excess (Adult)  Goal: Optimal Fluid Balance  Outcome: Ongoing (interventions implemented as appropriate)

## 2019-07-09 NOTE — PROGRESS NOTES
"NEPHROLOGY PROGRESS NOTE    [facility]  [phone]  Katya Forrest MD      Patient Care Team:  Suma Watson MD as PCP - General  INDERJIT Esquivel MD as PCP - Claims Attributed      Provider:  Katya Forrest MD  Patient Name: Esperanza Valencia  :  1929    SUBJECTIVE:  No SOB, CP, dysuria. Feeling better    Medication:    carvedilol 3.125 mg Oral BID With Meals   ferrous sulfate 324 mg Oral Daily With Breakfast   furosemide 40 mg Intravenous Daily   levothyroxine 50 mcg Oral Daily   pantoprazole 40 mg Oral QAM AC   potassium chloride 20 mEq Oral Daily   sodium bicarbonate 650 mg Oral TID   sodium chloride 3 mL Intravenous Q12H   warfarin 3 mg Oral Daily       Pharmacy to dose warfarin    Pharmacy to dose warfarin    Pharmacy to dose warfarin        OBJECTIVE    Vital Sign Min/Max for last 24 hours  Temp  Min: 97.5 °F (36.4 °C)  Max: 98.1 °F (36.7 °C)   BP  Min: 94/58  Max: 125/79   Pulse  Min: 62  Max: 67   Resp  Min: 16  Max: 18   SpO2  Min: 94 %  Max: 95 %   No Data Recorded   No Data Recorded     Flowsheet Rows      First Filed Value   Admission Height  152.4 cm (60\") Documented at 2019 1143   Admission Weight  51.7 kg (113 lb 15.7 oz) Documented at 2019 1143          No intake/output data recorded.  I/O last 3 completed shifts:  In: 1080 [P.O.:1080]  Out: -     Physical Exam:  General Appearance: alert, appears stated age and cooperative  Head: normocephalic, without obvious abnormality and atraumatic  Eyes: conjunctivae and sclerae normal and no icterus  Neck: supple and no JVD  Lungs: clear to auscultation and respirations regular NO CRACKLES  Heart: regular rhythm & normal rate and normal S1, S2 +NABIL  Chest: Wall no abnormalities observed  Abdomen: normal bowel sounds and soft non-tender  Extremities: moves extremities well, NO SIGNIFICANT EDEMA NOW, no cyanosis and no redness  Skin: no bleeding, bruising or rash  Neurologic: Alert, and " oriented. No focal deficits    Labs:    WBC WBC   Date Value Ref Range Status   07/09/2019 3.30 (L) 3.40 - 10.80 10*3/mm3 Final   07/08/2019 4.10 3.40 - 10.80 10*3/mm3 Final   07/07/2019 3.10 (L) 3.40 - 10.80 10*3/mm3 Final      HGB Hemoglobin   Date Value Ref Range Status   07/09/2019 9.0 (L) 12.0 - 15.9 g/dL Final   07/08/2019 9.8 (L) 12.0 - 15.9 g/dL Final   07/07/2019 9.1 (L) 12.0 - 15.9 g/dL Final   07/06/2019 9.6 (L) 12.0 - 15.9 g/dL Final      HCT Hematocrit   Date Value Ref Range Status   07/09/2019 27.7 (L) 34.0 - 46.6 % Final   07/08/2019 29.9 (L) 34.0 - 46.6 % Final   07/07/2019 27.4 (L) 34.0 - 46.6 % Final   07/06/2019 28.4 (L) 34.0 - 46.6 % Final      Platlets No results found for: LABPLAT   MCV MCV   Date Value Ref Range Status   07/09/2019 89.0 79.0 - 97.0 fL Final   07/08/2019 89.8 79.0 - 97.0 fL Final   07/07/2019 89.0 79.0 - 97.0 fL Final          Sodium Sodium   Date Value Ref Range Status   07/09/2019 136 136 - 144 mmol/L Final   07/08/2019 137 136 - 144 mmol/L Final   07/07/2019 136 136 - 144 mmol/L Final      Potassium Potassium   Date Value Ref Range Status   07/09/2019 4.6 3.6 - 5.1 mmol/L Final   07/08/2019 4.3 3.6 - 5.1 mmol/L Final   07/07/2019 4.3 3.6 - 5.1 mmol/L Final      Chloride Chloride   Date Value Ref Range Status   07/09/2019 107 101 - 111 mmol/L Final   07/08/2019 106 101 - 111 mmol/L Final   07/07/2019 107 101 - 111 mmol/L Final      CO2 CO2   Date Value Ref Range Status   07/09/2019 23.0 22.0 - 32.0 mmol/L Final   07/08/2019 22.0 22.0 - 32.0 mmol/L Final   07/07/2019 20.0 (L) 22.0 - 32.0 mmol/L Final      BUN BUN   Date Value Ref Range Status   07/09/2019 46 (H) 8 - 20 mg/dL Final   07/08/2019 44 (H) 8 - 20 mg/dL Final   07/07/2019 45 (H) 8 - 20 mg/dL Final      Creatinine Creatinine   Date Value Ref Range Status   07/09/2019 2.20 (H) 0.40 - 1.00 mg/dL Final   07/08/2019 2.10 (H) 0.40 - 1.00 mg/dL Final   07/07/2019 2.10 (H) 0.40 - 1.00 mg/dL Final      Calcium Calcium   Date  Value Ref Range Status   07/09/2019 7.8 (L) 8.9 - 10.3 mg/dL Final   07/08/2019 8.1 (L) 8.9 - 10.3 mg/dL Final   07/07/2019 8.0 (L) 8.9 - 10.3 mg/dL Final      PO4 No components found for: PO4   Albumin Albumin   Date Value Ref Range Status   07/09/2019 2.20 (L) 3.50 - 4.80 g/dL Final   07/08/2019 2.30 (L) 3.50 - 4.80 g/dL Final   07/07/2019 2.20 (L) 3.50 - 4.80 g/dL Final      Magnesium Magnesium   Date Value Ref Range Status   07/09/2019 2.3 1.8 - 2.5 mg/dL Final   07/08/2019 2.2 1.8 - 2.5 mg/dL Final      Uric Acid No components found for: URIC ACID     Imaging Results (last 72 hours)     Procedure Component Value Units Date/Time    US Renal Bilateral [[id number]] Collected:  07/05/19 2121     Updated:  07/05/19 2126    Narrative:       Examination: US RENAL BILATERAL-     Date of Exam: 7/5/2019 8:14 PM     Indication: Acute on chronic renal failure.     Comparison: None available.     Technique: Grayscale and color Doppler ultrasound evaluation of the  kidneys and urinary bladder was performed     Findings:  The right kidney measures 7.4 x 3.4 x 3.0 cm and the left kidney  measures 7.5 x 4.3 x 2.7 cm. Both kidneys are small in size with  cortical thickening indicating renal atrophy. There is no  hydronephrosis, echogenic shadowing stone, or renal mass. The patient  has incidental small bilateral basilar pleural effusions.The visualized  urinary bladder is unremarkable. The estimated urinary bladder volume is  20 cc.          Impression:          1. Bilateral renal atrophy.  2. Small bilateral basilar pleural effusions.  3. The visualized urinary bladder is unremarkable.     Electronically Signed By-Luciano Nick On:7/5/2019 9:24 PM  This report was finalized on [id number] by  Luciano Nick, .    XR Chest 1 View [[id number]] Collected:  07/05/19 1236     Updated:  07/05/19 1242    Narrative:       DATE OF EXAM:  7/5/2019 12:26 PM     PROCEDURE:  XR CHEST 1 VW-     INDICATIONS:  Short of breath      COMPARISON:  9/3/2018     TECHNIQUE:   Single radiographic AP view of the chest was obtained.     FINDINGS:  There are bilateral interstitial opacities which appear to have  increased compared to the prior exam. Patient is status post CABG with  cardiomegaly and dual-lead pacemaker, which is unchanged. Small  bilateral pleural effusions are suspected. No evidence of pneumothorax.  Regional skeleton is unremarkable.        Impression:          1. Increasing, predominantly interstitial, and somewhat groundglass  opacities. Favor interstitial edema. Atypical infection, or chronic,  interstitial pneumonitis opacities are less favored.     Electronically Signed By-Hesham Fisher On:7/5/2019 12:40 PM  This report was finalized on [id number] by  Hesham Fisher, .          Results for orders placed during the hospital encounter of 07/05/19   XR Chest 1 View    Narrative DATE OF EXAM:  7/5/2019 12:26 PM     PROCEDURE:  XR CHEST 1 VW-     INDICATIONS:  Short of breath     COMPARISON:  9/3/2018     TECHNIQUE:   Single radiographic AP view of the chest was obtained.     FINDINGS:  There are bilateral interstitial opacities which appear to have  increased compared to the prior exam. Patient is status post CABG with  cardiomegaly and dual-lead pacemaker, which is unchanged. Small  bilateral pleural effusions are suspected. No evidence of pneumothorax.  Regional skeleton is unremarkable.        Impression    1. Increasing, predominantly interstitial, and somewhat groundglass  opacities. Favor interstitial edema. Atypical infection, or chronic,  interstitial pneumonitis opacities are less favored.     Electronically Signed ByJaycee Fisher On:7/5/2019 12:40 PM  This report was finalized on [id number] by  Hesham Fisher, .            ASSESSMENT      Acute on chronic congestive heart failure (CMS/[facility])      PLAN    1. ARF/RANJITH/CRF/CKD STG 3------Nonoliguric. BUN/Cr up just a little. In the long run, we may have to accept a higher  baseline serum creatinine in order to keep euvolemic. No NSAIDs. No IV Dye.  Lytes, acid-base okay. No uremia    2. HTN WITH CKD STG 3-----BP okay    3. CHF/VOLUME OVERLOAD------Clinically better.     4. S/P COUMDAIN TOXICITY    5. ANEMIA OF CKD    6. ACIDOSIS    7. HYPOCALCEMIA-----Replace IV. Follow ionized levels    Katya Forrest MD  [facility]  [phone]  07/09/19  7:51 AM

## 2019-07-09 NOTE — DISCHARGE SUMMARY
Date of Admission: 7/5/2019    Date of Discharge:  7/9/2019    Length of stay:  LOS: 4 days   Hospital Course  History of present illness 89-year-old female complains of a 3-day history of increasing shortness of breath and leg swelling.  No chest pain.  Worse with exertion better with rest moderate to severe.  No fever chills sweats cough congestion vomiting or diarrhea no recent long car rides plane ride immobilization or foreign travels no recent hospitalization.  No ill exposures.  Her past medical history includes hypertension hypothyroidism coronary artery disease paroxysmal atrial fibrillation sick sinus syndrome valvular disease past surgical history includes CABG with aortic valve replacement in 2001 she also has a dual-chamber pacemaker [employer] initially placed in 2003 with replacement in August 2010     Hospital course and problem list    Acute on chronic respiratory failure  Resolved  Due to CHF, pleural effusions  Diuresis with IV Lasix with good result  Duo nebs as needed     Coumadin toxicity  Resolved status post dose of vitamin K     A. fib  Start Coumadin today pharmacy dosing  Continue with rate control switch to carvedilol from Cardizem  Status post pacemaker  Restart Coumadin will go to 2-3, home health care for close INR checks every couple days for the next week with results to PCP who will further adjust medications as needed     Acute on chronic kidney insufficiency  Creatinine stabilized, likely a new baseline  Due to CHF  Hospitalization and treatment overseen by nephrology follow-up with him as an outpatient     Status post aortic valve replacement  Bioprosthetic  Echo noted     Congestive heart failure  EF of 45-50  Acute on chronic   Was diuresed as mentioned above  Continue home medications otherwise  Per cardiology     Hypothyroidism  Continue Synthroid     GERD  Continue Protonix     Anemia   Hb stabl    Hypocalcemia  We will go ahead and replace IV    Plan physical therapy  cleared for discharge home with home health, will arrange home health for visiting nurse for medication management as well as INR checks every couple days for the next week until INR stabilizes.  Coumadin to be adjusted by primary care doctor as long as cleared by cardiology nephrology here we will plan for discharge later today    Physical Exam   Constitutional: He is oriented to person, place, and time. No distress.   HENT:   Head: Normocephalic and atraumatic.   Eyes: Conjunctivae and EOM are normal. Pupils are equal, round, and reactive to light.   Neck: No JVD present. No thyromegaly present.   Cardiovascular: Normal rate, regular rhythm, normal heart sounds and intact distal pulses. Exam reveals no gallop and no friction rub.   No murmur heard.  Pulmonary/Chest: Effort normal and breath sounds normal. No stridor. No respiratory distress. He has no wheezes. He has no rales. He exhibits no tenderness.   Abdominal: Soft. Bowel sounds are normal. He exhibits no distension and no mass. There is no tenderness. There is no rebound and no guarding. No hernia.   Musculoskeletal: Normal range of motion.   Lymphadenopathy:     He has no cervical adenopathy.   Neurological: He is alert and oriented to person, place, and time. No cranial nerve deficit or sensory deficit. He exhibits normal muscle tone.   Skin: No rash noted. He is not diaphoretic.   Psychiatric: He has a normal mood and affect.   Vitals reviewed.                Pertinent Test Results:     Lab Results (last 48 hours)     Procedure Component Value Units Date/Time    Calcium, Ionized [[id number]]  (Abnormal) Collected:  07/09/19 0604    Specimen:  Blood Updated:  07/09/19 0639     Ionized Calcium 1.13 mmol/L     Comprehensive Metabolic Panel [[id number]]  (Abnormal) Collected:  07/09/19 0421    Specimen:  Blood Updated:  07/09/19 0515     Glucose 92 mg/dL      BUN 46 mg/dL      Creatinine 2.20 mg/dL      Sodium 136 mmol/L      Potassium 4.6 mmol/L      Chloride  107 mmol/L      CO2 23.0 mmol/L      Calcium 7.8 mg/dL      Total Protein 5.3 g/dL      Albumin 2.20 g/dL      ALT (SGPT) 14 U/L      AST (SGOT) 29 U/L      Alkaline Phosphatase 61 U/L      Total Bilirubin 0.9 mg/dL      eGFR Non African Amer 21 mL/min/1.73      Globulin 3.1 gm/dL      A/G Ratio 0.7 g/dL      BUN/Creatinine Ratio 20.9     Anion Gap 10.6 mmol/L     Narrative:       The MDRD GFR formula is only valid for adults with stable renal function between ages 18 and 70.    Magnesium [[id number]]  (Normal) Collected:  07/09/19 0421    Specimen:  Blood Updated:  07/09/19 0515     Magnesium 2.3 mg/dL     Phosphorus [[id number]]  (Normal) Collected:  07/09/19 0421    Specimen:  Blood Updated:  07/09/19 0515     Phosphorus 2.9 mg/dL     Protime-INR [[id number]]  (Normal) Collected:  07/09/19 0421    Specimen:  Blood Updated:  07/09/19 0449     Protime 23.3 Seconds      INR 2.42    CBC & Differential [[id number]] Collected:  07/09/19 0421    Specimen:  Blood Updated:  07/09/19 0441    Narrative:       The following orders were created for panel order CBC & Differential.  Procedure                               Abnormality         Status                     ---------                               -----------         ------                     CBC Auto Differential[[id number]]        Abnormal            Final result                 Please view results for these tests on the individual orders.    CBC Auto Differential [[id number]]  (Abnormal) Collected:  07/09/19 0421    Specimen:  Blood Updated:  07/09/19 0441     WBC 3.30 10*3/mm3      RBC 3.12 10*6/mm3      Hemoglobin 9.0 g/dL      Hematocrit 27.7 %      MCV 89.0 fL      MCH 29.0 pg      MCHC 32.6 g/dL      RDW 16.8 %      RDW-SD 52.1 fl      MPV 8.2 fL      Platelets 67 10*3/mm3      Neutrophil % 70.1 %      Lymphocyte % 18.8 %      Monocyte % 9.6 %      Eosinophil % 0.6 %      Basophil % 0.9 %      Neutrophils, Absolute 2.30 10*3/mm3      Lymphocytes, Absolute 0.60  10*3/mm3      Monocytes, Absolute 0.30 10*3/mm3      Eosinophils, Absolute 0.00 10*3/mm3      Basophils, Absolute 0.00 10*3/mm3      nRBC 0.0 /100 WBC     Protime-INR [[id number]]  (Normal) Collected:  07/08/19 1309    Specimen:  Blood Updated:  07/08/19 1349     Protime 21.7 Seconds      INR 2.26    Protime-INR [[id number]]  (Normal) Collected:  07/08/19 0248    Specimen:  Blood Updated:  07/08/19 0353     Protime 28.4 Seconds      INR 2.94    Renal Function Panel [[id number]]  (Abnormal) Collected:  07/08/19 0248    Specimen:  Blood Updated:  07/08/19 0349     Glucose 99 mg/dL      BUN 44 mg/dL      Creatinine 2.10 mg/dL      Sodium 137 mmol/L      Potassium 4.3 mmol/L      Chloride 106 mmol/L      CO2 22.0 mmol/L      Calcium 8.1 mg/dL      Albumin 2.30 g/dL      Phosphorus 3.1 mg/dL      Anion Gap 13.3 mmol/L      BUN/Creatinine Ratio 21.0     eGFR Non African Amer 22 mL/min/1.73     Narrative:       The MDRD GFR formula is only valid for adults with stable renal function between ages 18 and 70.    Magnesium [[id number]]  (Normal) Collected:  07/08/19 0248    Specimen:  Blood Updated:  07/08/19 0349     Magnesium 2.2 mg/dL     CBC & Differential [[id number]] Collected:  07/08/19 0248    Specimen:  Blood Updated:  07/08/19 0321    Narrative:       The following orders were created for panel order CBC & Differential.  Procedure                               Abnormality         Status                     ---------                               -----------         ------                     CBC Auto Differential[[id number]]        Abnormal            Final result                 Please view results for these tests on the individual orders.    CBC Auto Differential [[id number]]  (Abnormal) Collected:  07/08/19 0248    Specimen:  Blood Updated:  07/08/19 0321     WBC 4.10 10*3/mm3      RBC 3.33 10*6/mm3      Hemoglobin 9.8 g/dL      Hematocrit 29.9 %      MCV 89.8 fL      MCH 29.4 pg      MCHC 32.8 g/dL      RDW 16.6 %       RDW-SD 52.1 fl      MPV 8.2 fL      Platelets 75 10*3/mm3      Neutrophil % 76.8 %      Lymphocyte % 14.3 %      Monocyte % 8.0 %      Eosinophil % 0.2 %      Basophil % 0.7 %      Neutrophils, Absolute 3.20 10*3/mm3      Lymphocytes, Absolute 0.60 10*3/mm3      Monocytes, Absolute 0.30 10*3/mm3      Eosinophils, Absolute 0.00 10*3/mm3      Basophils, Absolute 0.00 10*3/mm3      nRBC 0.2 /100 WBC             Results for orders placed during the hospital encounter of 07/05/19   Adult Transthoracic Echo Complete W/ Cont if Necessary Per Protocol    Narrative · Left ventricular systolic function is normal.  · Estimated EF appears to be in the range of 56 - 60%  · There is a bioprosthetic aortic valve present  · The prosthetic aortic valve has the following abnormalities: stenosis  · Peak/mean aortic valve gradients are 38/22 mmHg respectively  · Mild-to-moderate mitral valve regurgitation is present  · Right ventricular cavity is severely dilated.  · Left atrial cavity size is severely dilated.          Imaging Results (all)     Procedure Component Value Units Date/Time    US Renal Bilateral [[id number]] Collected:  07/05/19 2121     Updated:  07/05/19 2126    Narrative:       Examination: US RENAL BILATERAL-     Date of Exam: 7/5/2019 8:14 PM     Indication: Acute on chronic renal failure.     Comparison: None available.     Technique: Grayscale and color Doppler ultrasound evaluation of the  kidneys and urinary bladder was performed     Findings:  The right kidney measures 7.4 x 3.4 x 3.0 cm and the left kidney  measures 7.5 x 4.3 x 2.7 cm. Both kidneys are small in size with  cortical thickening indicating renal atrophy. There is no  hydronephrosis, echogenic shadowing stone, or renal mass. The patient  has incidental small bilateral basilar pleural effusions.The visualized  urinary bladder is unremarkable. The estimated urinary bladder volume is  20 cc.          Impression:          1. Bilateral renal atrophy.  2.  "Small bilateral basilar pleural effusions.  3. The visualized urinary bladder is unremarkable.     Electronically Signed By-Luciano Nick On:7/5/2019 9:24 PM  This report was finalized on [id number] by  Luciano Nick, .    XR Chest 1 View [[id number]] Collected:  07/05/19 1236     Updated:  07/05/19 1242    Narrative:       DATE OF EXAM:  7/5/2019 12:26 PM     PROCEDURE:  XR CHEST 1 VW-     INDICATIONS:  Short of breath     COMPARISON:  9/3/2018     TECHNIQUE:   Single radiographic AP view of the chest was obtained.     FINDINGS:  There are bilateral interstitial opacities which appear to have  increased compared to the prior exam. Patient is status post CABG with  cardiomegaly and dual-lead pacemaker, which is unchanged. Small  bilateral pleural effusions are suspected. No evidence of pneumothorax.  Regional skeleton is unremarkable.        Impression:          1. Increasing, predominantly interstitial, and somewhat groundglass  opacities. Favor interstitial edema. Atypical infection, or chronic,  interstitial pneumonitis opacities are less favored.     Electronically Signed By-Hesham Fisher On:7/5/2019 12:40 PM  This report was finalized on [id number] by  Hesham Fisher, .            Vital Signs  Visit Vitals  /61 (BP Location: Left arm, Patient Position: Lying)   Pulse 61   Temp 97.6 °F (36.4 °C) (Oral)   Resp 16   Ht 152.4 cm (60\")   Wt 49.9 kg (110 lb)   SpO2 96%   BMI 21.48 kg/m²       Physical Exam:  Physical Exam      Discharge Medications     Discharge Medications      New Medications      Instructions Start Date   carvedilol 3.125 MG tablet  Commonly known as:  COREG   3.125 mg, Oral, 2 Times Daily With Meals         Changes to Medications      Instructions Start Date   warfarin 3 MG tablet  Commonly known as:  COUMADIN  What changed:    · when to take this  · additional instructions  · Another medication with the same name was removed. Continue taking this medication, and follow the directions you see " here.   3 mg, Oral, Nightly         Continue These Medications      Instructions Start Date   cholecalciferol 1000 units tablet  Commonly known as:  VITAMIN D3   1,000 Units, Oral, Daily      FERROCITE 324 (106 Fe) MG tablet  Generic drug:  Ferrous Fumarate   325 mg, Oral, Daily      furosemide 20 MG tablet  Commonly known as:  LASIX   20 mg, Oral, Every Evening      levothyroxine 50 MCG tablet  Commonly known as:  SYNTHROID, LEVOTHROID   50 mcg, Oral, Daily      pantoprazole 40 MG EC tablet  Commonly known as:  PROTONIX   40 mg, Oral, Daily      potassium chloride 20 MEQ CR tablet  Commonly known as:  K-DUR,KLOR-CON   20 mEq, Oral, Daily         Stop These Medications    diltiaZEM  MG 24 hr capsule  Commonly known as:  CARDIZEM CD            Discharge Diet:   Diet Instructions     Diet: Cardiac      Discharge Diet:  Cardiac          Activity at Discharge:   Activity Instructions     Activity as Tolerated            Follow-up Appointments  Future Appointments   Date Time Provider Department Center   7/12/2019 11:15 AM DIMITRIOS [facility]   9/17/2019 11:15 AM INDERJIT Esquivel MD [facility]     Additional Instructions for the Follow-ups that You Need to Schedule     Ambulatory Referral to Home Health   As directed      Face to Face Visit Date:  7/8/2019    Follow-up Provider for Plan of Care?:  I treated the patient in an acute care facility and will not continue treatment after discharge.    Follow-up Provider:  LAURA WATSON [[id number]]    Reason/Clinical Findings:  CHF    Describe mobility limitations that make leaving home difficult:  weakness    Nursing/Therapeutic Services Requested:  Other (eval and treat)    Frequency:  1 Week 1         Discharge Follow-up with PCP   As directed       Currently Documented PCP:    Laura Watson MD    PCP Phone Number:    [phone]     Follow Up Details:  2 days         Discharge Follow-up with Specialty:  cardiology/nephrology; 1 Week   As directed      Specialty:  cardiology/nephrology    Follow Up:  1 Week         Discharge Follow-up with Specified Provider: needs PT/INR in 2 days to be done by home health and comunicated to pcp to adjust dose as needed   As directed      To:  needs PT/INR in 2 days to be done by home health and comunicated to pcp to adjust dose as needed                   Kaur Ramires MD  07/09/19  12:15 PM    Time: Discharge 38 minutes min

## 2019-07-10 NOTE — PROGRESS NOTES
Case Management Discharge Note    Final Note: Home with [facility].                 Final Discharge Disposition Code: 06 - home with home health care

## 2019-07-11 NOTE — OUTREACH NOTE
CHF Week 1 Survey      Responses   Facility patient discharged from?  [facility]   Does the patient have one of the following disease processes/diagnoses(primary or secondary)?  CHF   Is there a successful TCM telephone encounter documented?  No   CHF Week 1 attempt successful?  No   Unsuccessful attempts  Attempt 4 [Left voicemail]          Brigette Marroquin LPN

## 2019-07-11 NOTE — OUTREACH NOTE
Prep Survey      Responses   Facility patient discharged from?  [facility]   Is patient eligible?  Yes   Discharge diagnosis  CHF   Does the patient have one of the following disease processes/diagnoses(primary or secondary)?  CHF   Does the patient have Home health ordered?  Yes   What is the Home health agency?    [facility]   Prep survey completed?  Yes          Laura Perkins RN

## 2019-07-16 NOTE — PROGRESS NOTES
Subjective   Esperanza Valencia is a 89 y.o. female.     Comes in for follow up after she was admitted to [facility] recently with acute resp failure sec to chf with pleural effusions  She also had coumadin toxicity  She has home health  Last inr was on the 12th and was therapeutic  Lives alone  PT coming  Says an inr was done yest by HH  Family with her          The following portions of the patient's history were reviewed and updated as appropriate: allergies, current medications, past family history, past medical history, past social history, past surgical history and problem list.  Past Medical History:   Diagnosis Date   • CAD (coronary artery disease)    • CHF (congestive heart failure) (CMS/HCC)    • Hypertension    • Hypothyroidism    • Paroxysmal atrial fibrillation (CMS/HCC)    • Sick sinus syndrome (CMS/HCC)    • Valvular disease      Past Surgical History:   Procedure Laterality Date   • CORONARY ARTERY BYPASS GRAFT  2001    With aortic valve replacement    • PACEMAKER IMPLANTATION  09/23/2003    Dual chambers -MDT /Gen replacement 08/18/2010     Family History   Problem Relation Age of Onset   • Heart disease Mother    • Heart disease Father      Social History     Socioeconomic History   • Marital status:      Spouse name: Not on file   • Number of children: Not on file   • Years of education: Not on file   • Highest education level: Not on file   Tobacco Use   • Smoking status: Never Smoker   • Smokeless tobacco: Never Used   Substance and Sexual Activity   • Alcohol use: No     Frequency: Never   • Drug use: No         Current Outpatient Medications:   •  carvedilol (COREG) 3.125 MG tablet, TAKE 1 TABLET BY MOUTH TWICE DAILY WITH MEALS, Disp: 180 tablet, Rfl: 3  •  cholecalciferol (VITAMIN D3) 1000 units tablet, Take 1,000 Units by mouth Daily., Disp: , Rfl:   •  FERROCITE 324 MG tablet, Take 325 mg by mouth Daily., Disp: , Rfl: 5  •  furosemide (LASIX) 20 MG tablet, Take 20 mg by mouth Every  "Evening., Disp: , Rfl: 5  •  levothyroxine (SYNTHROID, LEVOTHROID) 50 MCG tablet, Take 50 mcg by mouth Daily., Disp: , Rfl:   •  pantoprazole (PROTONIX) 40 MG EC tablet, Take 40 mg by mouth Daily., Disp: , Rfl: 5  •  potassium chloride (K-DUR,KLOR-CON) 20 MEQ CR tablet, Take 20 mEq by mouth Daily., Disp: , Rfl:     Review of Systems   Constitutional: Negative for diaphoresis, fatigue, fever, unexpected weight gain and unexpected weight loss.   Respiratory: Negative for cough, chest tightness and shortness of breath.    Cardiovascular: Negative for chest pain, palpitations and leg swelling.   Gastrointestinal: Negative for nausea and vomiting.   Neurological: Negative for dizziness, syncope and headache.     BP 93/59 (BP Location: Right arm, Patient Position: Sitting, Cuff Size: Adult)   Pulse 65   Temp 97.3 °F (36.3 °C) (Oral)   Ht 157.5 cm (62\")   Wt 49.9 kg (110 lb)   SpO2 96%   BMI 20.12 kg/m²       Objective   Physical Exam   Constitutional: She appears well-developed and well-nourished. No distress.   HENT:   Head: Normocephalic and atraumatic.   Neck: Neck supple. No JVD present.   Cardiovascular: Normal rate, regular rhythm, normal heart sounds and intact distal pulses. Exam reveals no gallop and no friction rub.   No murmur heard.  Pulmonary/Chest: Effort normal and breath sounds normal. No respiratory distress. She has no wheezes. She has no rales.   Musculoskeletal: She exhibits edema (trace).   Lymphadenopathy:     She has no cervical adenopathy.   Neurological: She is alert.   Generalized weakness   Skin: Skin is warm and dry.         Assessment/Plan   Problems Addressed this Visit        Cardiovascular and Mediastinum    Acute on chronic congestive heart failure (CMS/HCC) - Primary      Other Visit Diagnoses     Coumadin toxicity, accidental or unintentional, subsequent encounter        resolved  I do not have the inr done yest per pt but will try to get that  she will stay on current dosing    "

## 2019-07-18 NOTE — OUTREACH NOTE
CHF Week 1 Survey      Responses   Facility patient discharged from?  [facility]   Does the patient have one of the following disease processes/diagnoses(primary or secondary)?  CHF          Brigette Marroquin, AMAIRANIN

## 2019-07-23 NOTE — OUTREACH NOTE
Patient Outreach Note    Pt was discharged from [facility] on 7/9/19, admitted with shortness of breath and leg swelling. Pt since has followed up with PCP, MD Watson on 7/16/19. Pt states the swelling in her legs has improved. Pt is elevating her legs when possible. Pt states she is weighing herself every morning and her weight is not changing. Instructed pt to report any weight gain of 3 lbs or more overnight or 5 lbs in a week to MD right away. Pt verbalized understanding. Pt states she has not been as short of breath, states she feels short of breath when she's moving around a lot. Pt states she is taking her medications as prescribed and that her daughter helps with this. Pt states her daughter lives across the street and checks on her multiple times a day. Confirmed with pt that is she still taking coumadin. Education provided regarding anticoagulant and home safety measures. Pt states she has not had her INR checked in a few days, appears pt missed lab appointment, RN-CC will follow up on this. Pt states [facility] is coming twice a week and she also has PT coming every week. Pt is using walker to assist with ambulation. Pt feels she is well supported with her daughter and the services she is receiving and declines further outreach at this time. RN-CC will follow up for any future needs.     Zhao Blum RN    7/23/2019, 2:46 PM

## 2019-07-23 NOTE — OUTREACH NOTE
Care Coordination Note    Called and spoke with MD Shirley Nowak's MA, to inquire about pts INR draw. She states she will contact [facility] to coordinate this.     Zhao Blum RN    7/23/2019, 3:31 PM

## 2019-07-26 NOTE — TELEPHONE ENCOUNTER
Home Health Nurse called in regards to her PT/INR she needs a call back ASAP. [phone] ([place])

## 2019-07-28 PROBLEM — N18.9 CHRONIC PROGRESSIVE RENAL FAILURE: Status: ACTIVE | Noted: 2018-01-01

## 2019-07-28 PROBLEM — D63.8 ANEMIA OF CHRONIC DISEASE: Status: ACTIVE | Noted: 2018-01-01

## 2019-07-28 PROBLEM — E03.9 HYPOTHYROIDISM: Status: ACTIVE | Noted: 2019-01-01

## 2019-07-28 PROBLEM — I10 HYPERTENSION: Status: ACTIVE | Noted: 2019-01-01

## 2019-07-28 PROBLEM — G62.9 PERIPHERAL NEUROPATHY: Status: ACTIVE | Noted: 2018-08-02

## 2019-07-28 PROBLEM — I87.2 VENOUS INSUFFICIENCY OF LOWER EXTREMITY: Status: ACTIVE | Noted: 2019-01-01

## 2019-07-29 NOTE — PROGRESS NOTES
Cardiology Office Visit Note      Referring physician:  Shirley    Reason For Followup: hospital FU     HPI:  Esperanza Valencia is a 89 y.o. female nown hx hypertensive heart dz, prior bioprosthetic AVR with concomitant CABG in 2001 and chronic afib with permanent VVI PM.    Presents FU recent hospitalization for CHF.  Echo on 7/6/19 revealed LVSF normal, aortic valvle gradients are 38/22 mmHg respectively,   Mild to moderate MVR, RV severely dialted; LA severely dilated.      Patient on long term anticoagulant therapy but held doses last two days per recommendation of home health nurse. INR today 3.5.     Functionally limited by arthralgias/leg pain.  Uses walker for ambulation outside the home.    Reports continued DOWNING/weakness.  Also complains lotion edema with history of chronic venous insufficiency and admitting to spend a great deal time in the gravity dependent sitting position.    Virginia is accompanied today by her daughter, per usual, who claims she has been compliant with medications as listed and reviewed.  Apparently, she had diltiazem CD discontinued at time of her recent hospitalization and switched to carvedilol 3.5 g p.o. twice daily.  The patient and her daughter both indicated they were surprised and disappointed I was not contacted to her recent hospital sedation for congestive failure symptoms..              Past Medical History:   Diagnosis Date   • CAD (coronary artery disease)    • CHF (congestive heart failure) (CMS/Formerly [facility])    • Hypertension    • Hypothyroidism    • Paroxysmal atrial fibrillation (CMS/HCC)    • Sick sinus syndrome (CMS/HCC)    • Valvular disease        Past Surgical History:   Procedure Laterality Date   • CORONARY ARTERY BYPASS GRAFT  2001    With aortic valve replacement    • INSERT / REPLACE / REMOVE PACEMAKER  09/2003    medtronic   • PACEMAKER IMPLANTATION  09/23/2003    Dual chambers -MDT /Gen replacement 08/18/2010         Current Outpatient Medications:   •   carvedilol (COREG) 3.125 MG tablet, TAKE 1 TABLET BY MOUTH TWICE DAILY WITH MEALS, Disp: 180 tablet, Rfl: 3  •  FERROCITE 324 MG tablet, Take 325 mg by mouth Daily., Disp: , Rfl: 5  •  furosemide (LASIX) 20 MG tablet, Take 20 mg by mouth Every Evening., Disp: , Rfl: 5  •  levothyroxine (SYNTHROID, LEVOTHROID) 50 MCG tablet, Take 50 mcg by mouth Daily., Disp: , Rfl:   •  pantoprazole (PROTONIX) 40 MG EC tablet, Take 40 mg by mouth Daily., Disp: , Rfl: 5  •  potassium chloride (K-DUR,KLOR-CON) 20 MEQ CR tablet, Take 20 mEq by mouth Daily., Disp: , Rfl:     Social History     Socioeconomic History   • Marital status:      Spouse name: Not on file   • Number of children: Not on file   • Years of education: Not on file   • Highest education level: Not on file   Tobacco Use   • Smoking status: Never Smoker   • Smokeless tobacco: Never Used   Substance and Sexual Activity   • Alcohol use: No     Frequency: Never   • Drug use: No       Family History   Problem Relation Age of Onset   • Heart disease Mother    • Heart disease Father          Review of Systems   General: denies fever, chills, anorexia, weight loss; does report generalized weakness  Eyes: denies blurring, diplopia  Ear/Nose/Throat: denies ear pain, nosebleeds, hoarseness  Cardiovascular: See HPI  Respiratory: denies excessive sputum, hemoptysis, wheezing  Gastrointestinal: denies nausea, vomiting, change in bowel habits, abdominal pain  Genitourinary: denies dysuria and hematuria  Musculoskeletal: Has mild to moderate chronic back pain and generalized arthralgias with lower extremity and generalized weakness  Skin: denies rashes, itching; has mild extensor surface ecchymoses  Neurologic: denies focal neuro deficits  Psychiatric: denies depression, anxiety  Endocrine: denies cold intolerance, heat intolerance  Hematologic/Lymphatic: denies abnormal bruising, bleeding  Allergic/Immunologic: denies urticaria or persistent infections      Objective  "    Visit Vitals  /60   Pulse 73   Ht 160 cm (63\")   Wt 49.4 kg (109 lb)   SpO2 98% Comment: room air   BMI 19.31 kg/m²           Physical Exam  General:      Elderly female no acute distress appears weak, frail and chronically ill  Head:     normocephalic and atraumatic.    Eyes:    PERRL/EOM intact, conjunctiva and sclera clear with out nystagmus.    Neck:    no jvd or bruits  Chest Wall:    no deformities   Lungs:    clear bilaterally to auscultation with adequate global airflow   Heart:    non-displaced PMI; regular rate and rhythm, normal S1, S2 without murmurs, rubs, or gallops  Abdomen:  Soft, nontender without HSM  Msk:    no deformity; adequate R OM  Pulses:    pulses normal in all 4 extremities.    Extremities:    no clubbing, cyanosis, edema   Neurologic:    no focal sensory or motor deficits  Skin:    intact without lesions or rashes.    Psych:    alert and cooperative; normal mood and affect; normal attention span and concentration.            ECG 12 Lead  Date/Time: 7/29/2019 9:23 PM  Performed by: INDERJIT Esquivel MD  Authorized by: INDERJIT Esquivel MD   Comparison: not compared with previous ECG   Previous ECG: no previous ECG available  Comments: Abnormal tracing shows underlying atrial fibrillation with 100% V pacing at rate of 70 bpm              Assessment:   1. Permanent atrial fibrillation (CMS/HCC)  Remains in permanent atrial fibrillation with sufficient rate control  -Patient remains fully anticoagulated on warfarin    2. Acute on chronic congestive heart failure, unspecified heart failure type (CMS/HCC)  -Clinically improved though still has modest lower extremity edema, but no other clinical signs or symptoms.    3. Venous insufficiency of both lower extremities  -Has persistent 2+ lower leg edema which is largely gravity dependent    4. Essential hypertension  -Remains well-regulated on current medications    5. 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 Presence of cardiac pacemaker  -Satisfactory device site and functioning        Plan:  1.  Which BMP and BNP as well as serum magnesium level in view of recent change in diuretic therapy with persistent lower leg edema.  2.  In view of borderline toxic range INR (3.5) today, will have patient hold warfarin for 2 more days then resume warfarin therapy at 2 mg every 6 p.m. and recheck PT/INR in 8 to 9 days by home health nurse.  3.  Return to clinic in 1 month or sooner if further problems in the interim.    INDERJIT Esquivel MD  7/29/2019 5:21 PM

## 2019-08-08 NOTE — TELEPHONE ENCOUNTER
Nieves from [facility] called and stated her PT was 2.4 and her INR was 30.1. Call back is [phone]

## 2019-08-13 NOTE — OUTREACH NOTE
Care Coordination Note    Received call back from Adrianne [facility] ED Case Manager. Informed Adrianne about previous care advising outreach with pt and informed her RN-CC contacted [facility] HH and pt is still current with their services so they have been notified. Instructed Adrianne to call RN-CC with any needs and informed her RN-CC will follow up as necessary.     Zhao Blum RN    8/13/2019, 12:45 PM

## 2019-08-13 NOTE — OUTREACH NOTE
Care Coordination Note    Voicemail left with [facility] ED Case Manager. Had planned to provide pt information. Pt current in the [facility]. Will follow as necessary.    Zhao Blum RN    8/13/2019, 12:29 PM

## 2019-08-13 NOTE — OUTREACH NOTE
Care Coordination Note    Called [facility] to inquire if pt was still active with their services. Pt was receiving HH and PT in July. Spoke with Amaya at [facility]. Amaya states pt is still active with them. RN-CC notified Amaya that pt was in the ED for a fall. RN-CC will follow as necessary.     Zhao Blum RN    8/13/2019, 12:29 PM

## 2019-08-14 NOTE — OUTREACH NOTE
Care Coordination Note    RN-CC called pt for outreach, left voicemail. Call placed to [facility] to inquire about HH appointments for pt following ED discharge yesterday. Spoke with Amaya at [facility]. Amaya states pt is being seen by nurse tomorrow and will have INR checked. Noted and scheduled next RN-CC outreach.     Zhao Blum RN    8/14/2019, 3:10 PM

## 2019-08-15 PROBLEM — N17.9 AKI (ACUTE KIDNEY INJURY) (HCC): Status: ACTIVE | Noted: 2019-01-01

## 2019-08-15 PROBLEM — R53.1 WEAKNESS: Status: ACTIVE | Noted: 2019-01-01

## 2019-08-15 PROBLEM — I95.89 HYPOTENSION DUE TO HYPOVOLEMIA: Status: ACTIVE | Noted: 2019-01-01

## 2019-08-15 PROBLEM — T45.511A COUMADIN TOXICITY: Status: ACTIVE | Noted: 2019-01-01

## 2019-08-15 PROBLEM — E87.5 HYPERKALEMIA: Status: ACTIVE | Noted: 2019-01-01

## 2019-08-15 PROBLEM — E86.1 HYPOTENSION DUE TO HYPOVOLEMIA: Status: ACTIVE | Noted: 2019-01-01

## 2019-08-15 PROBLEM — K92.2 ACUTE GI BLEEDING: Status: ACTIVE | Noted: 2019-01-01

## 2019-08-16 NOTE — ANESTHESIA POSTPROCEDURE EVALUATION
Patient: Esperanza Valencia    Procedure Summary     Date:  08/16/19 Room / Location:  [facility] ENDOSCOPY 1 / [facility] ENDOSCOPY    Anesthesia Start:  1356 Anesthesia Stop:  1413    Procedure:  ESOPHAGOGASTRODUODENOSCOPY AT BEDSIDE (N/A ) Diagnosis:       Acute GI bleeding      (Acute GI bleeding [K92.2])    Surgeon:  Elif Kumar MD Provider:  Reid Fisher MD    Anesthesia Type:  MAC ASA Status:  4          Anesthesia Type: MAC  Last vitals  BP   111/51 (08/16/19 1100)   Temp   98.8 °F (37.1 °C) (08/16/19 1100)   Pulse   63 (08/16/19 1100)   Resp   16 (08/15/19 2300)     SpO2   99 % (08/16/19 1100)     Post Anesthesia Care and Evaluation    Patient location during evaluation: PACU  Patient participation: complete - patient participated  Level of consciousness: awake  Pain scale: See nurse's notes for pain score.  Pain management: adequate  Airway patency: patent  Anesthetic complications: No anesthetic complications  PONV Status: none  Cardiovascular status: acceptable  Respiratory status: acceptable  Hydration status: acceptable    Comments: Patient seen and examined postoperatively; vital signs stable; SpO2 greater than or equal to 90%; cardiopulmonary status stable; nausea/vomiting adequately controlled; pain adequately controlled; no apparent anesthesia complications; patient discharged from anesthesia care when discharge criteria were met

## 2019-08-19 PROBLEM — I48.0 PAROXYSMAL ATRIAL FIBRILLATION (HCC): Chronic | Status: ACTIVE | Noted: 2019-01-01

## 2020-01-29 NOTE — ANESTHESIA PREPROCEDURE EVALUATION
Anesthesia Evaluation     Patient summary reviewed and Nursing notes reviewed   NPO Solid Status: > 8 hours  NPO Liquid Status: > 8 hours           Airway   Mallampati: II  TM distance: >3 FB  Neck ROM: full  No difficulty expected  Dental - normal exam     Pulmonary - negative pulmonary ROS and normal exam    breath sounds clear to auscultation  Cardiovascular - normal exam  Exercise tolerance: unable to assess    ECG reviewed  Rhythm: regular  Rate: normal    (+) hypertension, valvular problems/murmurs, CAD, CABG, dysrhythmias, CHF, PVD,       Neuro/Psych  (+) weakness, numbness,     GI/Hepatic/Renal/Endo    (+)  GI bleeding, hypothyroidism,     Musculoskeletal (-) negative ROS    Abdominal  - normal exam   Substance History - negative use     OB/GYN negative ob/gyn ROS         Other - negative ROS                       Anesthesia Plan    ASA 4     MAC     intravenous induction   Anesthetic plan, all risks, benefits, and alternatives have been provided, discussed and informed consent has been obtained with: patient.      
Cooperative/Awake/Alert

## (undated) DEVICE — BITEBLOCK ENDO W/STRAP 60F A/ LF DISP

## (undated) DEVICE — PK ENDO GI 50

## (undated) DEVICE — PAPR PRNT PK SONY W RIBN UPC55